# Patient Record
Sex: FEMALE | URBAN - METROPOLITAN AREA
[De-identification: names, ages, dates, MRNs, and addresses within clinical notes are randomized per-mention and may not be internally consistent; named-entity substitution may affect disease eponyms.]

---

## 2024-09-25 ENCOUNTER — LAB REQUISITION (OUTPATIENT)
Dept: LAB | Facility: CLINIC | Age: 29
End: 2024-09-25

## 2024-09-25 DIAGNOSIS — R10.2 PELVIC AND PERINEAL PAIN: ICD-10-CM

## 2024-09-25 PROCEDURE — 87591 N.GONORRHOEAE DNA AMP PROB: CPT | Performed by: MIDWIFE

## 2024-09-25 PROCEDURE — 87491 CHLMYD TRACH DNA AMP PROBE: CPT | Performed by: MIDWIFE

## 2024-09-26 LAB
C TRACH DNA SPEC QL NAA+PROBE: NEGATIVE
N GONORRHOEA DNA SPEC QL NAA+PROBE: NEGATIVE

## 2024-12-19 ENCOUNTER — APPOINTMENT (OUTPATIENT)
Dept: INTERPRETER SERVICES | Facility: CLINIC | Age: 29
End: 2024-12-19

## 2024-12-27 ENCOUNTER — HOSPITAL ENCOUNTER (OUTPATIENT)
Dept: ULTRASOUND IMAGING | Facility: CLINIC | Age: 29
Discharge: HOME | End: 2024-12-27
Attending: MIDWIFE | Admitting: MIDWIFE

## 2024-12-27 DIAGNOSIS — N91.2 AMENORRHEA: ICD-10-CM

## 2024-12-27 PROCEDURE — T1013 SIGN LANG/ORAL INTERPRETER: HCPCS | Mod: U4

## 2024-12-27 PROCEDURE — 76801 OB US < 14 WKS SINGLE FETUS: CPT | Mod: 26 | Performed by: RADIOLOGY

## 2024-12-27 PROCEDURE — 76801 OB US < 14 WKS SINGLE FETUS: CPT

## 2025-01-10 ENCOUNTER — LAB REQUISITION (OUTPATIENT)
Dept: LAB | Facility: CLINIC | Age: 30
End: 2025-01-10

## 2025-01-10 ENCOUNTER — MEDICAL CORRESPONDENCE (OUTPATIENT)
Dept: HEALTH INFORMATION MANAGEMENT | Facility: CLINIC | Age: 30
End: 2025-01-10

## 2025-01-10 DIAGNOSIS — O09.91 SUPERVISION OF HIGH RISK PREGNANCY, UNSPECIFIED, FIRST TRIMESTER: ICD-10-CM

## 2025-01-10 PROCEDURE — 84460 ALANINE AMINO (ALT) (SGPT): CPT | Performed by: MIDWIFE

## 2025-01-10 PROCEDURE — 82565 ASSAY OF CREATININE: CPT | Performed by: MIDWIFE

## 2025-01-11 LAB
ALT SERPL W P-5'-P-CCNC: 13 U/L (ref 0–50)
CREAT SERPL-MCNC: 0.48 MG/DL (ref 0.51–0.95)
EGFRCR SERPLBLD CKD-EPI 2021: >90 ML/MIN/1.73M2

## 2025-01-13 ENCOUNTER — TRANSCRIBE ORDERS (OUTPATIENT)
Dept: MATERNAL FETAL MEDICINE | Facility: CLINIC | Age: 30
End: 2025-01-13

## 2025-01-13 DIAGNOSIS — O26.90 PREGNANCY RELATED CONDITION, ANTEPARTUM: Primary | ICD-10-CM

## 2025-01-14 ENCOUNTER — CARE COORDINATION (OUTPATIENT)
Dept: MATERNAL FETAL MEDICINE | Facility: CLINIC | Age: 30
End: 2025-01-14

## 2025-01-14 DIAGNOSIS — O09.899 HX OF PRETERM DELIVERY, CURRENTLY PREGNANT: Primary | ICD-10-CM

## 2025-01-15 ENCOUNTER — LAB REQUISITION (OUTPATIENT)
Dept: LAB | Facility: CLINIC | Age: 30
End: 2025-01-15

## 2025-01-15 DIAGNOSIS — O09.91 SUPERVISION OF HIGH RISK PREGNANCY, UNSPECIFIED, FIRST TRIMESTER: ICD-10-CM

## 2025-01-15 PROCEDURE — 87591 N.GONORRHOEAE DNA AMP PROB: CPT | Performed by: MIDWIFE

## 2025-01-15 PROCEDURE — 87491 CHLMYD TRACH DNA AMP PROBE: CPT | Performed by: MIDWIFE

## 2025-01-16 LAB
C TRACH DNA SPEC QL NAA+PROBE: NEGATIVE
N GONORRHOEA DNA SPEC QL NAA+PROBE: NEGATIVE
SPECIMEN TYPE: NORMAL
SPECIMEN TYPE: NORMAL

## 2025-01-27 ENCOUNTER — PRE VISIT (OUTPATIENT)
Dept: MATERNAL FETAL MEDICINE | Facility: CLINIC | Age: 30
End: 2025-01-27

## 2025-02-04 ENCOUNTER — APPOINTMENT (OUTPATIENT)
Dept: INTERPRETER SERVICES | Facility: CLINIC | Age: 30
End: 2025-02-04

## 2025-02-11 NOTE — PROGRESS NOTES
[facility]  [address] [place] [address] [address], [place] [address]  Main: [phone], Fax: [phone]       Camelia Santillan  : 1995  MRN: [id number]    REFERRAL: Josephine Hand CNM     Due to language barrier, a [place] iPad  was present during the history-taking and subsequent discussion (and for part of the physical exam) with this patient.    HPI     Camelia Santillan is a 30 year old  at 16w3d by LMP c/w 9w4d US here for MFM consultation regarding history of multiple pre-term deliveries and unknown  section scar. Her obstetric history is further detailed below.     In 2023 at 19w6d, Camelia presented to [facility] in [place] for evaluation of vaginal bleeding. At this time, she reported history of three prior  deliveries, one of which she reports was via  section (unknown uterine incision). On presentation to the triage unit she continued to have vaginal bleeding and an ultrasound was performed with evidence of a complete placenta previa with an abruption noted on the anterior aspect of the placenta and behind the placenta above the cervix with areas of active blood flow. She ultimately underwent dilation and evacuation that was notable for an EBL of 700 mL requiring use of multiple uterotonic agents secondary to uterine atony. Final pathology of the placenta revealed evidence of chronic intra-amniotic infection as well as a two vessel umbilical cord.     Camelia notably immigrated to the [place] in [place] with her partner and three children. Per chart review, prior to the aforementioned pregnancy, her obstetric history is additionally notable for the following.     2013: Spontaneous vaginal delivery at 28 weeks in the setting of pre-term labor; of note, had evidence of placenta previa earlier in the pregnancy (26 weeks), but was told that it had resolved prior to delivery. She reports that she  "started to experience regular uterine contractions and subsequent heavy vaginal bleeding. Reports that from the time she presented to the hospital until she delivered was 2 hours. Reports that her infant weight 1800 grams. [[facility]]    2018: Spontaneous vaginal delivery at 32 weeks in the setting of pre-term labor after onset of regular uterine contractions. States that after presenting to the hospital her infant delivered within 6 hours. [Critical access hospital]    2019:  section (unknown uterine scar given lack of records) at around 30 weeks in the setting of pre-term labor and transverse presentation. She reports that due to her baby's position, her surgeon's first made a low-transverse incision, but ultimately needed to make a \"T\" shape in order to safely deliver her baby. [Critical access hospital]    Today, she reports is feeling well from an obstetric standpoint. The only medication she takes daily is a prenatal vitamin with folic acid supplementation.     Prenatal Care:  [facility] this [facility]     OB History    Para Term  AB Living   5 3 0 3 1 3   SAB IAB Ectopic Multiple Live Births   0 1 0 0 3      # Outcome Date GA Lbr Larry/2nd Weight Sex Type Anes PTL Lv   5 Current            4 IAB  20w0d    IAB Gen        Birth Comments: bleeding previa/hemorrhage elected for termination      Complications: Placenta Previa, Hemorrhage, Abruptio Placenta   3  18 30w0d   M CS-Unspec   RYLIE      Birth Comments: in [facility] in hospital   2  14 30w0d  2.4 kg (5 lb 4.7 oz) M Vag-Spont   RYLIE      Birth Comments: in hospital in [facility]   1  13 28w0d   M Vag-Spont   RYLIE      Birth Comments: In hospital in [facility] placentia previa       Gynecologic History   - Denies any history of abnormal pap smears or cervical excisional procedures, however, " "records unavailable   - Denies any history of frequent UTIs, vaginal infections, or STIs    Past Medical History   No past medical history on file.    Past Surgical History   No past surgical history on file.    Medication List     Prior to Admission medications    Not on File       Allergies   Patient has no known allergies.    Social History   Denies use of alcohol, drugs or smoking.    Family History   Please see genetic counseling note for detailed 3-generation family history.     Family history significant for Camelia reported her maternal grandmother had many miscarriages, Camelia's partner also reported his mother may have had three pregnancy losses as well.   Camelia's partner's maternal grandmother has diabetes.     Physical Exam   LMP 10/20/2024     Gen: Alert and oriented, in no acute distress  CV: Regular rate, well perfused  Resp: Non-labored breathing on room air  Abd: Non-distended  Ext: Warm, well perfused    Labs     Recent Results (from the past 720 hours)   Chlamydia trachomatis PCR    Collection Time: 01/15/25  1:33 PM    Specimen: Vagina; Swab   Result Value Ref Range    Chlamydia trachomatis Negative Negative    Chlamydia trachomatis Specimen Source Vagina    Neisseria gonorrhoeae PCR    Collection Time: 01/15/25  1:33 PM    Specimen: Vagina; Swab   Result Value Ref Range    Neisseria gonorrhoeae Negative Negative    Neisseria gonorrhoeae Specimen Source Vagina       Ultrasound   See today's ultrasound report under the \"imaging\" tab.    Other Pertinent Evaluation     US OB <14 WEEKS 12/27/2024  -----------------------------------------------  FINDINGS:   There is a gestational sac within the uterine fundus. The amniotic  fluid volume and sac size are within expected limits for gestation  age. There is an embryo and yolk sac present within the gestational  sac. The embryo has a crown-rump length of 27 mm corresponding to 9  weeks, 4 days gestation. Embryonic cardiac activity is present at 167  bpm beats " per minute. It is too early to accurately determine  placental site.     No free fluid in the pelvis. No uterine masses. The right ovary  measures 1.7 x 3.2 x 2.1 cm. The left ovary measures 1.8 x 4.4 x 2.4  cm with a corpus luteum. Normal blood flow to the ovaries.                                                              IMPRESSION:      Single intrauterine embryo with ultrasound gestational age of 9 weeks,  4 days corresponding to ultrasound estimated date of delivery of  2025.      Assessment/Counseling     Camelia Santillan is a 30 year old  at 15w1d by LMP c/w 9w4d US here for MFM consultation regarding history of multiple pre-term deliveries and unknown  section scar (likely a T-shaped incision given patient's description as detailed above).     Today, we discussed Camelia's history of multiple previous  deliveries.      birth is defined as a delivery occurring at or after 20 0/7 weeks of gestation and before 37 0/7 weeks of gestation.  birth may be spontaneous (following  labor, PPROM, or cervical insufficiency) or it may be indicated by a specific maternal or fetal complication. Many factors have been associated with  birth, including maternal demographics and characteristics, social and economic factors, medical complications, obstetric history, and conditions specific to the current pregnancy. Several risk factors for  birth are potentially modifiable, including low maternal prepregnancy weight, smoking, substance use, and short interpregnancy interval.     A history of  birth is a very strong predictor of subsequent  birth. The number of prior  births and the degree of prematurity at the prior birth significantly affect the recurrence risk of  birth. This risk of  birth is highest when the most recent pregnancy was complicated by  birth, or if the patient has a history of multiple   births. After one  birth, the risk of a recurrent  birth is as high as 30%. After two  births, the risk of recurrence is as high as 60%. In the majority of cases, a recurrent  birth will occur at a similar gestational age as the previous birth.    Clinical factors during a current pregnancy that have been associated with an increased risk of  birth include vaginal bleeding, urinary tract infections (UTIs), genital tract infections, multiple gestation, and short cervix.     There historically have been two main strategies for  birth prevention in subsequent pregnancies following a spontaneous  delivery: progesterone supplementation and cervical length screening with cerclage placement for short cervix.    Given the association of short cervix and  birth and the demonstrated efficacy of vaginal progesterone in the setting of short cervix - we would recommend assessment of cervical length in the second trimester as this has been shown to identify women at increased risk for  birth. Cervical length less than 25 mm before 24 weeks of gestation has a sensitivity of 65.4% for  birth before 35 weeks of gestation. Therefore, in the event of cervical shortening <25mm cerclage placement can be offered as it has been shown to prolong pregnancy and improve  outcomes. Because of the relatively high detection rate and predictive value in individuals with prior  birth, and because treatment is available, serial endovaginal ultrasound measurement of cervical length beginning at 16 0/7 weeks of gestation and repeated until 24 0/7 weeks of gestation for individuals with a molina pregnancy and a prior spontaneous  birth is recommended.   Recommendations     Genetic screening  - Met with genetic counseling (see separate note for full details)  - Had NIPT collected today for genetic screening    Medications  - Continue daily use of prenatal  "vitamin    Laboratory evaluation  - Urine culture every trimester with aggressive treatment of bacteriuria    Maternal antepartum management  - Clinical evaluation of  labor symptoms.   - Consider administration of  corticosteroids if the patient is deemed to be at increased risk of imminent  delivery     Ultrasound surveillance  - Serial cervical length measurements via transvaginal ultrasound weekly from now until 23w6d gestation  - If cervical shortening < 25mm prior to 24 weeks recommend further counseling regarding treatment with vaginal progesterone vs.cervical cerclage     Timing and mode of delivery  - Discussed that given unknown uterine scar with pre-term delivery and being told she had a \"T-Shaped\" incision, would recommend repeat  sectio between 36-37 weeks.  If able, obtain official operative report to confirm incision type. Discussed risks associated with labor on a prior classical uterine incision including uterine rupture. Discussed that uterine rupture is an obstetric emergency as it impairs oxygenation to the fetus and can result in infant demise. Additionally discussed increased risks to the pregnant individual as well. She expresses understanding of this recommendation.     The patient was seen and evaluated with Dr. Nichelle Henson     At the end of our discussion, Ms. Cody Santillan indicated that her questions were answered and she seemed satisfied with our discussion.  Thank you for allowing us to participate in the care of your patient. Please do not hesitate to contact us if you have further questions regarding the management of your patient.      Ledy Reyes MD  Obstetrics and Gynecology, PGY-2  25 4:05 PM  ---------                                                                                                          [facility] Physician Attestation  I saw this patient with the resident and agree with the their findings and plan of care as documented in " the note.  I personally reviewed her vital signs, medications, labs, pertinent imaging, and fetal monitoring.    I personally spent a total of 45 minutes, including both face-to-face and non-face-to-face on the date of the encounter, addressing the above diagnosis. Activities performed in this time include chart review, obtaining / reviewing history, performing a medically necessary evaluation, documentation and counseling/care coordination/ordering tests/ordering meds.      Nichelle Henson MD  Maternal Fetal Medicine   Date of Service (when I saw the patient): 2/17/2025

## 2025-02-13 ENCOUNTER — LAB (OUTPATIENT)
Dept: LAB | Facility: CLINIC | Age: 30
End: 2025-02-13
Attending: STUDENT IN AN ORGANIZED HEALTH CARE EDUCATION/TRAINING PROGRAM
Payer: MEDICAID

## 2025-02-13 ENCOUNTER — OFFICE VISIT (OUTPATIENT)
Dept: MATERNAL FETAL MEDICINE | Facility: CLINIC | Age: 30
End: 2025-02-13
Attending: STUDENT IN AN ORGANIZED HEALTH CARE EDUCATION/TRAINING PROGRAM
Payer: MEDICAID

## 2025-02-13 ENCOUNTER — HOSPITAL ENCOUNTER (EMERGENCY)
Facility: CLINIC | Age: 30
End: 2025-02-13

## 2025-02-13 VITALS
SYSTOLIC BLOOD PRESSURE: 93 MMHG | DIASTOLIC BLOOD PRESSURE: 61 MMHG | RESPIRATION RATE: 18 BRPM | HEART RATE: 69 BPM | OXYGEN SATURATION: 98 %

## 2025-02-13 DIAGNOSIS — O09.899 HX OF PRETERM DELIVERY, CURRENTLY PREGNANT: ICD-10-CM

## 2025-02-13 DIAGNOSIS — O09.899 HX OF PRETERM DELIVERY, CURRENTLY PREGNANT: Primary | ICD-10-CM

## 2025-02-13 DIAGNOSIS — O09.91 HIGH-RISK PREGNANCY IN FIRST TRIMESTER: ICD-10-CM

## 2025-02-13 DIAGNOSIS — Z87.51 HISTORY OF PRETERM DELIVERY: ICD-10-CM

## 2025-02-13 DIAGNOSIS — Z34.82 SUPERVISION OF NORMAL INTRAUTERINE PREGNANCY IN MULTIGRAVIDA IN SECOND TRIMESTER: ICD-10-CM

## 2025-02-13 DIAGNOSIS — Z34.82 SUPERVISION OF NORMAL INTRAUTERINE PREGNANCY IN MULTIGRAVIDA IN SECOND TRIMESTER: Primary | ICD-10-CM

## 2025-02-13 PROBLEM — Z28.39 RUBELLA NON-IMMUNE STATUS, ANTEPARTUM: Status: ACTIVE | Noted: 2025-01-15

## 2025-02-13 PROBLEM — E55.9 VITAMIN D DEFICIENCY: Status: ACTIVE | Noted: 2025-01-13

## 2025-02-13 PROBLEM — N85.8 UTERINE SCAR FROM PREVIOUS SURGERY: Status: ACTIVE | Noted: 2025-01-14

## 2025-02-13 PROBLEM — Z28.39 MATERNAL VARICELLA, NON-IMMUNE: Status: ACTIVE | Noted: 2025-01-13

## 2025-02-13 PROCEDURE — T1013 SIGN LANG/ORAL INTERPRETER: HCPCS | Mod: U4

## 2025-02-13 PROCEDURE — 36415 COLL VENOUS BLD VENIPUNCTURE: CPT

## 2025-02-13 PROCEDURE — 96041 GENETIC COUNSELING SVC EA 30: CPT

## 2025-02-13 PROCEDURE — G0463 HOSPITAL OUTPT CLINIC VISIT: HCPCS | Mod: 25 | Performed by: STUDENT IN AN ORGANIZED HEALTH CARE EDUCATION/TRAINING PROGRAM

## 2025-02-13 NOTE — PROGRESS NOTES
[facility]  Genetic Counseling Consult    Patient:  Camelia Santillan  Preferred Name: Camelia YOB: 1995   Date of Service:  25   MRN: [id number]    Camelia was seen at the [facility] for genetic consultation. The indication for genetic counseling is desire to discuss options for genetic screening and diagnostics and a history of  delivery. The patient was accompanied to this visit by their partner and cousin.    The session was conducted in English.      IMPRESSION/ PLAN   1. Camelia has not had genetic screening in this pregnancy but elected to have screening today.     2. During today's [facility] visit, Camelia had a blood draw for expanded non-invasive prenatal testing (also called NIPT, NIPS, or cell-free DNA) through [facility] ([employer]). The expanded NIPT screens for trisomy 21, 18, and 13 and select microdeletion syndromes, including 22q11.2 deletion syndrome. The patient opted to screen for sex chromosome aneuploidies, including reported fetal sex. Results are expected in 7-14 days. The patient will be called with results and if they do not answer they requested a detailed message with results on their voicemail, including the predicted fetal sex information.  Patient was informed that results, including fetal sex, will be available in [place].    3. Since the patient chose aneuploidy screening via NIPT, quad screen is NOT recommended in the second trimester. If the patient desires screening for open neural tube defects, maternal serum AFP only is recommended, ideally between 16-18 weeks gestation.    4. Camelia had a early second trimester anatomy ultrasound today. Please see the ultrasound report for further details.    5. Further recommendation include a follow-up ultrasound with [facility]. The upcoming ultrasound has been scheduled for 2025. Additional follow up ultrasounds have been scheduled weekly.  "    PREGNANCY HISTORY   /Parity:       Camelia's pregnancy history is significant for:   Three previous  deliveries, one of which was a  at 30 weeks. The other two were spontaneous vaginal deliveries at 28 and 30 weeks. History of one previous IAB/IUFD around 20 weeks due to placenta previa. Pathology report showed an intra-amniotic infection and a two vessel cord.  We briefly discussed her history of  births and discussed that having a history of  deliveries increases the chance of this current pregnancy being delivered early or  as well.     CURRENT PREGNANCY   Current Age: 30 year old   Age at Delivery: 30 year old  MARTIR: 2025, by Ultrasound                                   Gestational Age: 16w3d  This pregnancy is a single gestation.   This pregnancy was conceived spontaneously.    MEDICAL HISTORY   Camelia s reported medical history is not expected to impact pregnancy management or risks to fetal development.       FAMILY HISTORY   A three-generation pedigree was obtained today and is scanned under the \"Media\" tab in Epic. The family history was reported by Camelia and their partner.    The following significant findings were reported today:   Camelia reported her maternal grandmother had many miscarriages, Camelia's partner also reported his mother may have had three pregnancy losses as well.   Camelia's partner's maternal grandmother has diabetes.     Family history of RPL    Recurrent miscarriage is defined as three or more clinically recognized consecutive or non-consecutive pregnancy losses occurring prior to fetal viability (<24 weeks). Camelia reported her maternal grandmother may have had 15 or more pregnancy losses. No pregnancy losses were reported for Camelia's mother.     At least 10-15% of the recognized pregnancies end in miscarriage, with most losses occurring in the first trimester. The rate of miscarriage less than 8 weeks gestation may be even greater as some " women may not recognize that they are pregnant. Around half of miscarriages that occur before 20 weeks gestation are caused by chromosome abnormalities. Of these chromosomally atypical fetuses, ~ approximately 60% have an autosomal trisomy with trisomy 16 being the most common. Monosomy X is the second most common chromosomal etiology, accounting for ~20%, and triploidy accounts for ~15%. Polyploidies and structural rearrangements constitute the remainder. The risk for a miscarriage increases with advancing maternal age due to a higher incidence of conceptuses with a chromosomal aneuploidy. The risk may approach 75% in women who are 45 years of age and older.     Familial chromosome rearrangements can lead to a history of recurrent pregnancy loss or infertility. They can also increase the potential risk for a stillbirth or liveborn babies with birth differences, developmental concerns, or other health problems. In this case a parent would carry a balanced rearrangement with an equal exchange of chromosome material. An individual with a balanced translocation is typically healthy. However, a child of this individual may inherit one of the rearranged chromosomes, along with typical chromosomes, resulting in an unbalanced rearrangement with total loss and gain of chromosome material. If a conceptus has unbalanced chromosome material it could lead to miscarriages or an affected child. This result can be dependent on the amount of material that is gained or loss and what chromosome the material is from. If a chromosome translocation is inherited through a family there are typically multiple individuals over multiple generations with recurrent pregnancy loss and/or affected individuals. However, it is also possible for a de dash translocation or other rearrangement to occur. For couples who have experienced three or more unexplained pregnancy losses, it has been estimated that around 2-5% of these couples may carry a  rearrangement. Chromosome rearrangements causing pregnancy loss are more common in the female partner. Chromosome rearrangements in males are more likely to cause infertility. Chromosome analysis on parents is possible by obtaining a karyotype on peripheral blood.     Other genetic causes of recurrent loss could include lethal autosomal recessive conditions of x-linked dominant conditions. Non-genetic causes of pregnancy loss can include maternal uterine anomalies, endocrine concerns such as diabetes or thyroid disease, antiphospholipid syndrome, and environmental agents. Please see [facility] consult for further details.     In about 50% of couples with recurrent pregnancy loss, the etiology remains unknown despite a thorough evaluation and is therefore classified as idiopathic. It is estimated that couples with idiopathic recurrent pregnacy loss can have up to a 75% chance of having a successful pregnancy.    Family history of diabetes    Diabetes is a complex genetic trait (a multifactorial condition) caused by the combination of genetic and environmental factors. Having a family history of diabetes can increase one's risk of also developing diabetes. For men with type I diabetes, their children have a 1 in 17 chance of developing diabetes during their lifetime. However, for woman with type I diabetes, if their child is born before 25 there is a 1 in 25 risk and if their child was born after 25 there is a 1 in 100 risk. If a parent develops diabetes before age 11, their child's risk is typically doubled. Furthermore, if both parents have type I diabetes, the risk is between 1 in 10 to 1 in 4. These risk numbers are an estimate and can be complicated by many other factors such as autoimmune disorders and lifestyle choices. Therefore, there is currently no prenatal genetic testing we would offer the patient at this time to assess for diabetes risks in the current pregnancy. We also reviewed that individuals with a family  history of type II diabetes are generally thought to be at increased risk to develop type II diabetes. Therefore, it is important for individuals with a family history of type II diabetes to let their physicians to know about this family history, so they can be appropriate screened for diabetes.     Otherwise, the reported family history is unremarkable for stillbirths, birth defects, intellectual disabilities, known genetic conditions, and consanguinity.       RISK ASSESSMENT FOR INHERITED CONDITIONS AND CARRIER SCREENING OPTIONS   Expanded carrier screening is available to screen for autosomal recessive conditions and X-linked conditions in a large list of genes. Carrier screening does not test the pregnancy but gives a risk assessment for the pregnancy and future pregnancies to have the condition. Expanded carrier screening is designed to identify carrier status for conditions that are primarily childhood or adolescent onset. Expanded carrier screening does not evaluate for adult-onset conditions such as hereditary cancer syndromes, dementia/ Alzheimer's disease, or cardiovascular disease risk factors. Additionally, expanded carrier screening is not comprehensive for all known genetic diseases or inherited conditions. Carrier screening does not test for all genetic and health conditions or risk factors.     Autosomal recessive conditions happen when a mutation has been inherited from the egg and sperm and include conditions like cystic fibrosis, thalassemia, hearing loss, spinal muscular atrophy, and more. We reviewed that when both biological parents carry a harmful genetic change in a gene associated with autosomal recessive inheritance, each of their pregnancies has a 1 in 4 (25%) chance to be affected by that condition. X-linked conditions happen when a mutation has been inherited from the egg and include conditions like fragile X syndrome.With x-linked conditions, the specific risk generally depends on the  chromosomal sex of the fetus, with XY individuals (generally male) being most severely affected.      screening was reviewed. About [place]  Screening    The patient does NOT have a family history of known inherited conditions. This does NOT mean the patient and/or their partner is not a carrier of a condition. Approximately 90% of couples at an increased reproductive risk for an inherited condition have no family history of that condition.     The patient nor their partner have had carrier screening previously.     The patient declined the carrier screening options. They are aware the option will remain, and they can contact us if they would like to pursue screening. See below for the more detailed information we dicussed.    RISK ASSESSMENT FOR CHROMOSOME CONDITIONS   We explained that the risk for fetal chromosome abnormalities increases with maternal age. We discussed specific features of common chromosome abnormalities, including trisomy 21 (Down syndrome), trisomy 13, trisomy 18, and sex chromosome trisomies.    At age 30 at midtrimester, the risk to have a baby with Down syndrome is 1 in 690.   At age 30 at midtrimester, the risk to have a baby with any chromosome abnormality is 1 in 345.     Camelia has not had genetic screening in this pregnancy but elected to have screening today.      GENETIC TESTING OPTIONS   Genetic testing during a pregnancy includes screening and diagnostic procedures.      Screening tests are non-invasive which means no risk to the pregnancy and includes ultrasounds and blood work. The benefits and limitations of screening were reviewed. Screening tests provide a risk assessment (chance) specific to the pregnancy for certain fetal chromosome abnormalities but cannot definitively diagnose or exclude a fetal chromosome abnormality. Follow-up genetic counseling and consideration of diagnostic testing is recommended with any abnormal screening result. Diagnostic testing during a  pregnancy is more certain and can test for more conditions. However, the tests do have a risk of miscarriage that requires careful consideration. These tests can detect fetal chromosome abnormalities with greater than 99% certainty. Results can be compromised by maternal cell contamination or mosaicism and are limited by the resolution of current genetic testing technology.     There is no screening or diagnostic test that detects all forms of birth defects or intellectual disability.     We discussed the following screening options:     Non-invasive prenatal testing (NIPT)  Also called cell-free DNA screening because it detects chromosomes from the placenta in the pregnant person's blood  Can be done any time after 10 weeks gestation  Standard recommendation for NIPT screens for trisomy 21, trisomy 18, trisomy 13, with the option of adding sex chromosome aneuploidies, without or without predicted sex  Cannot screen for open neural tube defects, maternal serum AFP after 15 weeks is recommended  New NIPT options include screening for other trisomies, microdeletion syndromes, and in some cases fetal blood antigens. Guidelines do not recommend these conditions are included in standard screening. These options have limitations and should be discussed with a genetic counselor.   However, current (2023) ACMG guidelines do recommend that screening for one microdeletion syndrome, called 22q11.2 deletion syndrome be offered to all pregnant patients. 22q11.2 deletion syndrome has an estimated prevalence of 1 in 990 to 1 in 2148 (0.05-0.1%). Risk is not thought to increase with maternal age. Clinical features are variable but include congenital heart defects, cleft palate, developmental delays, immune system deficiencies, and hearing loss. Approximately 90% of cases are de dash (a sporadic new change in a pregnancy). Cell-free DNA screening for 22q11.2 deletion syndrome is available with the inclusion of other microdeletion  syndromes. There is less data about the performance of cell-free DNA screening for more rare microdeletions and the chance for false positives or negative may be increased.  We discussed the limitations of cell-free DNA screening in detecting microdeletions and the possibility of false positives and false negatives. The patient opted into microdeletion syndrome screening.     We discussed the following ultrasound options:    2/3 complete ultrasound:   early anatomy scan performed around 15 weeks of gestation. This can help identify findings such as soft markers and may provide additional risk assessment.    Comprehensive level II ultrasound (Fetal Anatomy Ultrasound)  Ultrasound done between 18-20 weeks gestation  Screens for major birth defects and markers for aneuploidy (like trisomy 21 and trisomy 18)  Includes looking at the fetus/baby's growth, heart, organs (stomach, kidneys), placenta, and amniotic fluid    We discussed the following diagnostic options:     Amniocentesis  Invasive diagnostic procedure done after 15 weeks gestation  The procedure collects a small sample of amniotic fluid for the purpose of chromosomal testing and/or other genetic testing  Diagnostic result; more than 99% sensitivity for fetal chromosome abnormalities  Testing for AFP in the amniotic fluid can test for open neural tube defects    It was a pleasure to be involved with [facility]. I spent 60 minutes on the date of the encounter doing chart review, obtaining history, test coordination, documentation, and further activities as noted above.    Wolfgang Gan MS, [facility]  Board Certified and [place] Licensed Genetic Counselor  [facility]  Maternal Fetal Medicine  Office: [phone]  [facility]: [phone]   Fax: [phone]  [facility]

## 2025-02-13 NOTE — NURSING NOTE
Camelia and Significant here for M  consult at 16w3d related to history of PTD x3, C/S x1. Medications and allergies reviewed. Dr. Reyes and Dr. RODERICK Henson met with patient to discuss plan. Plan for weekly TV until 23.6, growth US between 18-20 wks. Patient discharged stable and ambulatory.

## 2025-02-28 ENCOUNTER — HOSPITAL ENCOUNTER (OUTPATIENT)
Dept: ULTRASOUND IMAGING | Facility: CLINIC | Age: 30
Discharge: HOME | End: 2025-02-28
Attending: OBSTETRICS & GYNECOLOGY | Admitting: OBSTETRICS & GYNECOLOGY
Payer: MEDICAID

## 2025-02-28 DIAGNOSIS — O09.899 HX OF PRETERM DELIVERY, CURRENTLY PREGNANT: ICD-10-CM

## 2025-02-28 DIAGNOSIS — Z87.51 HISTORY OF PRETERM DELIVERY: ICD-10-CM

## 2025-02-28 DIAGNOSIS — O09.91 HIGH-RISK PREGNANCY IN FIRST TRIMESTER: ICD-10-CM

## 2025-02-28 PROCEDURE — 76817 TRANSVAGINAL US OBSTETRIC: CPT

## 2025-02-28 PROCEDURE — 76817 TRANSVAGINAL US OBSTETRIC: CPT | Mod: 26 | Performed by: OBSTETRICS & GYNECOLOGY

## 2025-03-04 PROBLEM — O26.872 SHORT CERVIX DURING PREGNANCY IN SECOND TRIMESTER: Status: ACTIVE | Noted: 2025-03-04

## 2025-03-07 ENCOUNTER — HOSPITAL ENCOUNTER (OUTPATIENT)
Dept: ULTRASOUND IMAGING | Facility: CLINIC | Age: 30
Discharge: HOME | End: 2025-03-07
Attending: STUDENT IN AN ORGANIZED HEALTH CARE EDUCATION/TRAINING PROGRAM | Admitting: STUDENT IN AN ORGANIZED HEALTH CARE EDUCATION/TRAINING PROGRAM
Payer: MEDICAID

## 2025-03-07 DIAGNOSIS — Z87.51 HISTORY OF PRETERM DELIVERY: ICD-10-CM

## 2025-03-07 DIAGNOSIS — O09.899 HX OF PRETERM DELIVERY, CURRENTLY PREGNANT: ICD-10-CM

## 2025-03-07 DIAGNOSIS — O09.91 HIGH-RISK PREGNANCY IN FIRST TRIMESTER: ICD-10-CM

## 2025-03-07 PROCEDURE — 76817 TRANSVAGINAL US OBSTETRIC: CPT | Mod: 26 | Performed by: STUDENT IN AN ORGANIZED HEALTH CARE EDUCATION/TRAINING PROGRAM

## 2025-03-07 PROCEDURE — 99214 OFFICE O/P EST MOD 30 MIN: CPT | Mod: 25 | Performed by: STUDENT IN AN ORGANIZED HEALTH CARE EDUCATION/TRAINING PROGRAM

## 2025-03-07 PROCEDURE — 76817 TRANSVAGINAL US OBSTETRIC: CPT

## 2025-03-10 ENCOUNTER — APPOINTMENT (OUTPATIENT)
Dept: INTERPRETER SERVICES | Facility: CLINIC | Age: 30
End: 2025-03-10
Payer: MEDICAID

## 2025-03-11 ENCOUNTER — TELEPHONE (OUTPATIENT)
Dept: MATERNAL FETAL MEDICINE | Facility: CLINIC | Age: 30
End: 2025-03-11
Payer: MEDICAID

## 2025-03-11 ENCOUNTER — APPOINTMENT (OUTPATIENT)
Dept: INTERPRETER SERVICES | Facility: CLINIC | Age: 30
End: 2025-03-11
Payer: MEDICAID

## 2025-03-11 NOTE — TELEPHONE ENCOUNTER
Writer spoke with Camelia with assistance of [place] phone  late afternoon 3/10 regarding cerclage that was scheduled for earlier in the day on 3/10. Pt stated she still wants the cerclage but wants to schedule it for Friday 3/14.     Cerclage scheduled for Friday 3/14 at 1030. Left message for patient to call KAREEN RN Coordinator at [phone] to discuss cerclage for 3/14.       Danielle Perez RN

## 2025-03-13 ENCOUNTER — ANESTHESIA EVENT (OUTPATIENT)
Dept: OBGYN | Facility: CLINIC | Age: 30
End: 2025-03-13
Payer: MEDICAID

## 2025-03-14 ENCOUNTER — ANESTHESIA (OUTPATIENT)
Dept: OBGYN | Facility: CLINIC | Age: 30
End: 2025-03-14
Payer: MEDICAID

## 2025-03-14 ENCOUNTER — HOSPITAL ENCOUNTER (OUTPATIENT)
Facility: CLINIC | Age: 30
Discharge: HOME | End: 2025-03-14
Attending: OBSTETRICS & GYNECOLOGY | Admitting: OBSTETRICS & GYNECOLOGY
Payer: MEDICAID

## 2025-03-14 VITALS
DIASTOLIC BLOOD PRESSURE: 55 MMHG | RESPIRATION RATE: 14 BRPM | HEART RATE: 64 BPM | OXYGEN SATURATION: 100 % | HEIGHT: 59 IN | TEMPERATURE: 97.9 F | BODY MASS INDEX: 23.2 KG/M2 | SYSTOLIC BLOOD PRESSURE: 96 MMHG | WEIGHT: 115.1 LBS

## 2025-03-14 LAB
ABO + RH BLD: NORMAL
ALBUMIN UR-MCNC: NEGATIVE MG/DL
APPEARANCE UR: CLEAR
BILIRUB UR QL STRIP: NEGATIVE
BLD GP AB SCN SERPL QL: NEGATIVE
COLOR UR AUTO: ABNORMAL
ERYTHROCYTE [DISTWIDTH] IN BLOOD BY AUTOMATED COUNT: 12.4 % (ref 10–15)
GLUCOSE UR STRIP-MCNC: NEGATIVE MG/DL
HCT VFR BLD AUTO: 35.7 % (ref 35–47)
HGB BLD-MCNC: 12 G/DL (ref 11.7–15.7)
HGB UR QL STRIP: NEGATIVE
KETONES UR STRIP-MCNC: NEGATIVE MG/DL
LEUKOCYTE ESTERASE UR QL STRIP: NEGATIVE
MCH RBC QN AUTO: 32.9 PG (ref 26.5–33)
MCHC RBC AUTO-ENTMCNC: 33.6 G/DL (ref 31.5–36.5)
MCV RBC AUTO: 98 FL (ref 78–100)
MUCOUS THREADS #/AREA URNS LPF: PRESENT /LPF
NITRATE UR QL: NEGATIVE
PH UR STRIP: 6.5 [PH] (ref 5–7)
PLATELET # BLD AUTO: 230 10E3/UL (ref 150–450)
RBC # BLD AUTO: 3.65 10E6/UL (ref 3.8–5.2)
RBC URINE: <1 /HPF
SP GR UR STRIP: 1.02 (ref 1–1.03)
SPECIMEN EXP DATE BLD: NORMAL
SQUAMOUS EPITHELIAL: 4 /HPF
UROBILINOGEN UR STRIP-MCNC: NORMAL MG/DL
WBC # BLD AUTO: 6 10E3/UL (ref 4–11)
WBC URINE: <1 /HPF

## 2025-03-14 PROCEDURE — 250N000011 HC RX IP 250 OP 636

## 2025-03-14 PROCEDURE — 81001 URINALYSIS AUTO W/SCOPE: CPT | Performed by: STUDENT IN AN ORGANIZED HEALTH CARE EDUCATION/TRAINING PROGRAM

## 2025-03-14 PROCEDURE — 710N000010 HC RECOVERY PHASE 1, LEVEL 2, PER MIN: Performed by: OBSTETRICS & GYNECOLOGY

## 2025-03-14 PROCEDURE — 258N000003 HC RX IP 258 OP 636

## 2025-03-14 PROCEDURE — 360N000074 HC SURGERY LEVEL 1, PER MIN: Performed by: OBSTETRICS & GYNECOLOGY

## 2025-03-14 PROCEDURE — 85018 HEMOGLOBIN: CPT

## 2025-03-14 PROCEDURE — 999N000141 HC STATISTIC PRE-PROCEDURE NURSING ASSESSMENT: Performed by: OBSTETRICS & GYNECOLOGY

## 2025-03-14 PROCEDURE — 87491 CHLMYD TRACH DNA AMP PROBE: CPT | Performed by: STUDENT IN AN ORGANIZED HEALTH CARE EDUCATION/TRAINING PROGRAM

## 2025-03-14 PROCEDURE — 86850 RBC ANTIBODY SCREEN: CPT

## 2025-03-14 PROCEDURE — 86900 BLOOD TYPING SEROLOGIC ABO: CPT

## 2025-03-14 PROCEDURE — 59320 REVISION OF CERVIX: CPT | Mod: GC | Performed by: OBSTETRICS & GYNECOLOGY

## 2025-03-14 PROCEDURE — 272N000001 HC OR GENERAL SUPPLY STERILE: Performed by: OBSTETRICS & GYNECOLOGY

## 2025-03-14 PROCEDURE — 250N000013 HC RX MED GY IP 250 OP 250 PS 637: Performed by: STUDENT IN AN ORGANIZED HEALTH CARE EDUCATION/TRAINING PROGRAM

## 2025-03-14 PROCEDURE — 250N000013 HC RX MED GY IP 250 OP 250 PS 637: Performed by: OBSTETRICS & GYNECOLOGY

## 2025-03-14 PROCEDURE — 370N000017 HC ANESTHESIA TECHNICAL FEE, PER MIN: Performed by: OBSTETRICS & GYNECOLOGY

## 2025-03-14 PROCEDURE — 85048 AUTOMATED LEUKOCYTE COUNT: CPT

## 2025-03-14 RX ORDER — ONDANSETRON 2 MG/ML
4 INJECTION INTRAMUSCULAR; INTRAVENOUS EVERY 6 HOURS PRN
Status: DISCONTINUED | OUTPATIENT
Start: 2025-03-14 | End: 2025-03-14 | Stop reason: HOSPADM

## 2025-03-14 RX ORDER — NALBUPHINE HYDROCHLORIDE 10 MG/ML
2.5-5 INJECTION INTRAMUSCULAR; INTRAVENOUS; SUBCUTANEOUS EVERY 6 HOURS PRN
Status: DISCONTINUED | OUTPATIENT
Start: 2025-03-14 | End: 2025-03-14 | Stop reason: HOSPADM

## 2025-03-14 RX ORDER — CITRIC ACID/SODIUM CITRATE 334-500MG
30 SOLUTION, ORAL ORAL ONCE
Status: CANCELLED | OUTPATIENT
Start: 2025-03-14 | End: 2025-03-14

## 2025-03-14 RX ORDER — ONDANSETRON 2 MG/ML
4 INJECTION INTRAMUSCULAR; INTRAVENOUS EVERY 30 MIN PRN
Status: DISCONTINUED | OUTPATIENT
Start: 2025-03-14 | End: 2025-03-14 | Stop reason: HOSPADM

## 2025-03-14 RX ORDER — METOCLOPRAMIDE 10 MG/1
10 TABLET ORAL EVERY 6 HOURS PRN
Status: DISCONTINUED | OUTPATIENT
Start: 2025-03-14 | End: 2025-03-14 | Stop reason: HOSPADM

## 2025-03-14 RX ORDER — ONDANSETRON 2 MG/ML
INJECTION INTRAMUSCULAR; INTRAVENOUS PRN
Status: DISCONTINUED | OUTPATIENT
Start: 2025-03-14 | End: 2025-03-14

## 2025-03-14 RX ORDER — PROCHLORPERAZINE MALEATE 10 MG
10 TABLET ORAL EVERY 6 HOURS PRN
Status: DISCONTINUED | OUTPATIENT
Start: 2025-03-14 | End: 2025-03-14 | Stop reason: HOSPADM

## 2025-03-14 RX ORDER — CITRIC ACID/SODIUM CITRATE 334-500MG
SOLUTION, ORAL ORAL
Status: COMPLETED
Start: 2025-03-14 | End: 2025-03-14

## 2025-03-14 RX ORDER — ONDANSETRON 4 MG/1
4 TABLET, ORALLY DISINTEGRATING ORAL EVERY 6 HOURS PRN
Status: DISCONTINUED | OUTPATIENT
Start: 2025-03-14 | End: 2025-03-14 | Stop reason: HOSPADM

## 2025-03-14 RX ORDER — ACETAMINOPHEN 325 MG/1
650 TABLET ORAL
Status: DISCONTINUED | OUTPATIENT
Start: 2025-03-14 | End: 2025-03-14 | Stop reason: HOSPADM

## 2025-03-14 RX ORDER — ONDANSETRON 4 MG/1
4 TABLET, ORALLY DISINTEGRATING ORAL EVERY 30 MIN PRN
Status: DISCONTINUED | OUTPATIENT
Start: 2025-03-14 | End: 2025-03-14 | Stop reason: HOSPADM

## 2025-03-14 RX ORDER — SODIUM CHLORIDE, SODIUM LACTATE, POTASSIUM CHLORIDE, CALCIUM CHLORIDE 600; 310; 30; 20 MG/100ML; MG/100ML; MG/100ML; MG/100ML
INJECTION, SOLUTION INTRAVENOUS CONTINUOUS PRN
Status: DISCONTINUED | OUTPATIENT
Start: 2025-03-14 | End: 2025-03-14 | Stop reason: HOSPADM

## 2025-03-14 RX ORDER — METOCLOPRAMIDE HYDROCHLORIDE 5 MG/ML
10 INJECTION INTRAMUSCULAR; INTRAVENOUS EVERY 6 HOURS PRN
Status: DISCONTINUED | OUTPATIENT
Start: 2025-03-14 | End: 2025-03-14 | Stop reason: HOSPADM

## 2025-03-14 RX ORDER — NALOXONE HYDROCHLORIDE 0.4 MG/ML
0.1 INJECTION, SOLUTION INTRAMUSCULAR; INTRAVENOUS; SUBCUTANEOUS
Status: DISCONTINUED | OUTPATIENT
Start: 2025-03-14 | End: 2025-03-14 | Stop reason: HOSPADM

## 2025-03-14 RX ORDER — FENTANYL CITRATE-0.9 % NACL/PF 10 MCG/ML
100 PLASTIC BAG, INJECTION (ML) INTRAVENOUS EVERY 5 MIN PRN
Status: DISCONTINUED | OUTPATIENT
Start: 2025-03-14 | End: 2025-03-14 | Stop reason: HOSPADM

## 2025-03-14 RX ORDER — FENTANYL CITRATE-0.9 % NACL/PF 10 MCG/ML
PLASTIC BAG, INJECTION (ML) INTRAVENOUS CONTINUOUS PRN
Status: DISCONTINUED | OUTPATIENT
Start: 2025-03-14 | End: 2025-03-14

## 2025-03-14 RX ORDER — CITRIC ACID/SODIUM CITRATE 334-500MG
30 SOLUTION, ORAL ORAL ONCE
Status: COMPLETED | OUTPATIENT
Start: 2025-03-14 | End: 2025-03-14

## 2025-03-14 RX ORDER — BUPIVACAINE HYDROCHLORIDE 7.5 MG/ML
INJECTION, SOLUTION INTRASPINAL
Status: COMPLETED | OUTPATIENT
Start: 2025-03-14 | End: 2025-03-14

## 2025-03-14 RX ORDER — DEXAMETHASONE SODIUM PHOSPHATE 4 MG/ML
4 INJECTION, SOLUTION INTRA-ARTICULAR; INTRALESIONAL; INTRAMUSCULAR; INTRAVENOUS; SOFT TISSUE
Status: DISCONTINUED | OUTPATIENT
Start: 2025-03-14 | End: 2025-03-14 | Stop reason: HOSPADM

## 2025-03-14 RX ORDER — SODIUM CHLORIDE, SODIUM LACTATE, POTASSIUM CHLORIDE, CALCIUM CHLORIDE 600; 310; 30; 20 MG/100ML; MG/100ML; MG/100ML; MG/100ML
INJECTION, SOLUTION INTRAVENOUS CONTINUOUS
Status: DISCONTINUED | OUTPATIENT
Start: 2025-03-14 | End: 2025-03-14 | Stop reason: HOSPADM

## 2025-03-14 RX ADMIN — ACETAMINOPHEN 650 MG: 325 TABLET, FILM COATED ORAL at 14:22

## 2025-03-14 RX ADMIN — BUPIVACAINE HYDROCHLORIDE IN DEXTROSE 1.2 ML: 7.5 INJECTION, SOLUTION SUBARACHNOID at 12:47

## 2025-03-14 RX ADMIN — SODIUM CHLORIDE, POTASSIUM CHLORIDE, SODIUM LACTATE AND CALCIUM CHLORIDE: 600; 310; 30; 20 INJECTION, SOLUTION INTRAVENOUS at 12:44

## 2025-03-14 RX ADMIN — SODIUM CITRATE AND CITRIC ACID MONOHYDRATE 30 ML: 500; 334 SOLUTION ORAL at 12:33

## 2025-03-14 RX ADMIN — Medication 30 ML: at 12:33

## 2025-03-14 RX ADMIN — ONDANSETRON 4 MG: 2 INJECTION INTRAMUSCULAR; INTRAVENOUS at 12:58

## 2025-03-14 RX ADMIN — SODIUM CHLORIDE, POTASSIUM CHLORIDE, SODIUM LACTATE AND CALCIUM CHLORIDE: 600; 310; 30; 20 INJECTION, SOLUTION INTRAVENOUS at 10:52

## 2025-03-14 RX ADMIN — Medication 25 MCG/MIN: at 12:48

## 2025-03-14 ASSESSMENT — ACTIVITIES OF DAILY LIVING (ADL)
ADLS_ACUITY_SCORE: 41
ADLS_ACUITY_SCORE: 15

## 2025-03-14 NOTE — PROGRESS NOTES
Patient able to eat/drink, ambulate and void. No VB or LOF. Mild cramping managed by PO Tylenol. Endorses + FM. Ok to discharge home. Spouse here to drive patient home. IV removed. Discharge instructions reviewed with patient via in person . Questions answered. Patient to call clinic to schedule regular OB follow up appt. US scheduled on 3/21 at [facility] clinic. Patient discharged at 1830.

## 2025-03-14 NOTE — PROGRESS NOTES
Pt prepped for cerclage. IV started and labs sent. Occ contraction noted on [place]. Pt reports not feeling them. Wet prep and GC/CT collected in OR and sent to lab. Cerclage performed without complication. See providers note. Doptone pre and post procedure WNL. No ancef or indocin needed. Pt recovering well and in stable condition currently.

## 2025-03-14 NOTE — H&P
"[facility]  OB History and Physical      Camelia Santillan MRN# [id number]   Age: 30 year old YOB: 1995     CC: Here for cerclage.     HPI:  Ms. Camelia Santillan is a 30 year old  at 20w4d by LMP=9w4d U/S, who presents for cervical cerclage.  She denies contractions, vaginal bleeding, and loss of fluid.     Camelia has a history of 3 prior  births and 19 week fetal loss, please see [facility] consult note on 25 for full details. She has been monitored with serial cervical length screening ultrasounds this pregnancy. On  it was difficult to visualize the CL transvaginally, therefore she had a speculum exam performed with a normal appearing anterior ectocervix, but difficult to visualize the posterior cervix. She was digitally checked and found to be ~fingertip dilated with a defect in the right lateral cervix. She was recommended to have an exam indicated cerclage at that time, but declined. She was prescribed vaginal progesterone to be used nightly, and recommended to come back in 1 week for a repeat CL U/S.     She returned for repeat cervical length U/S on 3/7. CL was stable at 25.2mm. Her internal os was digitally checked and found to be closed. She had not started vaginal progesterone yet. She then stated her desire to proceed with cerclage. She now presents today for history indicated cervical cerclage placement.     Pregnancy Complications:  1.  3  births between 28-32 weeks  2.  1 abruption and delivery at 19w6d   3.  1x C/S with likely \"T\" uterine hysterotomy incision (procedure performed in [facility])    Prenatal Labs:   Recent Results (from the past 720 hours)   [facility] Non-Invasive Prenatal Screening-Prequel    Collection Time: 25  3:25 PM   Result Value Ref Range    See Scanned Result       [facility] NON-INVASIVE PRENATAL SCREENING PREQUEL-Scanned   CBC with platelets    Collection Time: 25 10:26 AM   Result " Value Ref Range    WBC Count 6.0 4.0 - 11.0 10e3/uL    RBC Count 3.65 (L) 3.80 - 5.20 10e6/uL    Hemoglobin 12.0 11.7 - 15.7 g/dL    Hematocrit 35.7 35.0 - 47.0 %    MCV 98 78 - 100 fL    MCH 32.9 26.5 - 33.0 pg    MCHC 33.6 31.5 - 36.5 g/dL    RDW 12.4 10.0 - 15.0 %    Platelet Count 230 150 - 450 10e3/uL   Adult Type and Screen    Collection Time: 25 10:26 AM   Result Value Ref Range    ABO/RH(D) O POS     Antibody Screen Negative Negative    SPECIMEN EXPIRATION DATE [id number]    UA with Microscopic reflex to Culture    Collection Time: 25 10:54 AM    Specimen: Urine, Clean Catch   Result Value Ref Range    Color Urine Light Yellow Colorless, Straw, Light Yellow, Yellow    Appearance Urine Clear Clear    Glucose Urine Negative Negative mg/dL    Bilirubin Urine Negative Negative    Ketones Urine Negative Negative mg/dL    Specific Gravity Urine 1.021 1.003 - 1.035    Blood Urine Negative Negative    pH Urine 6.5 5.0 - 7.0    Protein Albumin Urine Negative Negative mg/dL    Urobilinogen Urine Normal Normal, 2.0 mg/dL    Nitrite Urine Negative Negative    Leukocyte Esterase Urine Negative Negative    Mucus Urine Present (A) None Seen /LPF    RBC Urine <1 <=2 /HPF    WBC Urine <1 <=5 /HPF    Squamous Epithelials Urine 4 (H) <=1 /HPF     OB History    Para Term  AB Living   5 3 0 3 1 3   SAB IAB Ectopic Multiple Live Births   0 1 0 0 3      # Outcome Date GA Lbr Larry/2nd Weight Sex Type Anes PTL Lv   5 Current            4 IAB 2023w0d    IAB Gen        Birth Comments: bleeding previa/hemorrhage elected for termination      Complications: Placenta Previa, Hemorrhage, Abruptio Placenta   3  18 30w0d   M CS-Unspec   RYLIE      Birth Comments: in [facility] in hospital   2  14 30w0d  2.4 kg (5 lb 4.7 oz) M Vag-Spont   RYLIE      Birth Comments: in hospital in [facility]   1  13 28w0d   M Vag-Spont   RYLIE      Birth Comments: In hospital in [facility] placentia  previa       PMHx:   History reviewed. No pertinent past medical history.    PSHx:   Past Surgical History:   Procedure Laterality Date     SECTION  2018     Meds:   Current Facility-Administered Medications   Medication Dose Route Frequency Provider Last Rate Last Admin    dexAMETHasone (DECADRON) injection 4 mg  4 mg Intravenous Once PRN Bautista Bruno MD        lactated ringers BOLUS 250 mL  250 mL Intravenous Once PRN Bautista Bruno MD        lactated ringers BOLUS 500 mL  500 mL Intravenous Once PRN Bautista Bruno MD   Rate Change at 25 1148    lactated ringers infusion   Intravenous Continuous PRN Bautista Bruno MD        lactated ringers infusion   Intravenous Continuous Bautista Bruno MD   Stopped at 25 1148    metoclopramide (REGLAN) tablet 10 mg  10 mg Oral Q6H PRN Bautista Bruno MD        Or    metoclopramide (REGLAN) injection 10 mg  10 mg Intravenous Q6H PRN Bautista Bruno MD        nalbuphine (NUBAIN) injection 2.5-5 mg  2.5-5 mg Intravenous Q6H PRN Bautista Bruno MD        naloxone (NARCAN) injection 0.1 mg  0.1 mg Intravenous Q2 Min PRN Bautista Bruno MD        ondansetron (ZOFRAN ODT) ODT tab 4 mg  4 mg Oral Q6H PRN Bautista Bruno MD        Or    ondansetron (ZOFRAN) injection 4 mg  4 mg Intravenous Q6H PRN Bautista Bruno MD        ondansetron (ZOFRAN ODT) ODT tab 4 mg  4 mg Oral Q30 Min PRN Bautista Bruno MD        Or    ondansetron (ZOFRAN) injection 4 mg  4 mg Intravenous Q30 Min PRN Bautista Bruno MD        phenylephrine (ABRAM-SYNEPHRINE) injection 100 mcg  100 mcg Intravenous Q5 Min PRN Bautista Bruno MD        prochlorperazine (COMPAZINE) tablet 10 mg  10 mg Oral Q6H PRN Bautista Bruno MD        Or    prochlorperazine (COMPAZINE) injection 10 mg  10 mg Intravenous Q6H PRN Bautista Bruno MD        prochlorperazine (COMPAZINE) injection 5 mg  5 mg Intravenous Q6H PRN Bautista Bruno MD     "    sodium citrate-citric acid (BICITRA) 500-334 MG/5ML solution              Allergies:  Penicillins - rash   FmHx: No family history on file.  SocHx: She denies any tobacco, alcohol, or other drug use during this pregnancy.    PE:  Vit: Vital signs:  Temp: 97.9  F (36.6  C) Temp src: Oral BP: 101/57     Resp: 18   O2 Device: None (Room air)   Height: 149 cm (4' 10.66\") Weight: 52.2 kg (115 lb 1.6 oz)  Estimated body mass index is 23.52 kg/m  as calculated from the following:    Height as of this encounter: 1.49 m (4' 10.66\").    Weight as of this encounter: 52.2 kg (115 lb 1.6 oz).    Gen: Well-appearing, NAD, comfortable   CV: Well perfused  Pulm: Breathing comfortably on room air    Abd: Soft, gravid, non-tender  Ext: LE edema b/l       Assessment/Plan  Camelia Santillan is a 30 year old  female at 20w4d by LMP=9w4d U/S, who presents for history indicated cervical cerclage.  Her pregnancy has otherwise complicated by: 3 prior  births and a 19w loss/delivery secondary to placental abruption.    History indicated Cerclage  We reviewed her prior  births with possible early cervical dilation, and we recommended proceeding with a history indicated cerclage at this time. We discussed that her cervix is on the shorter side, measuring between 24-29mm over the past 2 weeks, most recently measuring 25.2mm, and that with a shorter cervix she has a higher risk of ascending infection throughout her pregnancy, but that this risk is not increased by cerclage placement. We discussed that her cervix has been possibly a fingertip dilated, and if she is found to be so today, will stay for [facility] protocol (indocin and Ancef). We discussed the risk of bleeding, infection, or possible damage to surrounding organs. She was also counseled on the up to 80% chance of success rate of pregnancy prolongation up to planned cerclage removal time at 36 weeks when one is placed in a history indicated " setting. However, there is a possibility of placing the cerclage and it ultimately not preventing a previable, periviable, or  birth. After counseling she would like to proceed with cerclage placement.     - Discussed risks of a cerclage including but not limited to: bleeding (including placental), infection (including chorioamnionitis), damage to surrounding structures including but not limited to bowel, bladder, cervix, risk of PPROM, failure of cerclage to prevent pre-viable, adrián-viable or  birth. Reviewed that usually the cerclage is removed at 36 weeks unless indicated sooner. This can be done at the time of her repeat C/S due to history of T-incision hysterotomy.    - Will plan to check her cervix after spinal anesthesia in the OR. If she is dilated on digital exam, we will plan on [facility] exam indicated cerclage protocol with 3 doses of Ancef/Indocin adrián and post-op  - Doptones before and after the procedure  - Surgical and blood consents signed  - CBC, T&S  - GCCT, Wet prep, U/A with reflex to mirco and culture  - NPO at this time  - Anesthesia alerted    The patient was discussed with Dr. Bonner, who is in agreement with the treatment plan.    In-person [place] interpretor used throughout encounter and for consent process.     Yaneli Watson MD  Maternal Fetal Medicine Fellow

## 2025-03-14 NOTE — PROVIDER NOTIFICATION
03/14/25 1329   Vitals   Oximeter Heart Rate 63 bpm   Pulse 63   BP (!) 87/59   BP - Mean 69   Resp 16     Discussed BP with YOSELIN Bruno upon arrival to PACU. YOSELIN reported pt's BP runs a little lower and is ok with her BP being slightly lower as long as patient is asymptomatic and MAP is >60. Patient currently asymptomatic. Will continue to monitor BP.

## 2025-03-14 NOTE — PROGRESS NOTES
Patient arrived for cerclage placement. Denies VB, LOF and cramping/bessie. Endorses +FM. Patient here with her spouse. In person  interpreting. MD RODERICK Handley and RODERICK Watson informed of patient arrival.

## 2025-03-14 NOTE — OP NOTE
Operative Note: Cervical Cerclage         Pre-Op Diagnosis:   1) Single intrauterine pregnancy at 20w4d by LMP=9w4d U/S   2) Cervical insufficiency by 3 prior  births (28-32 weeks) and D&E at 19w due to placental abruption. Cervix short on ultrasound (24-29mm on serial cervical measurement) and possibly fingertip dilated on physical exam          Post-Op Diagnosis:   1) Same         Procedure:   1) History indicated transvaginal Leslie cervical cerclage, 1 suture (knot tied at 12 o'clock position)         Surgeons:   Attending: Ngozi Bonner MD  Fellow: Yaneli Watson MD, M Fellow  Resident: THANIA Vila2          Anesthesia:   Spinal          Estimated Blood Loss:   10cc         Findings:   1) Cervix was digitally closed prior to the procedure, closed following the procedure  2) Fetal heart tones confirmed by doptone following the procedure         Specimens:   1) GCCT and wet prep swabs          Complications:   1) None apparent          History:     Camelia Santillan is a 30 year old  at 20w4d by LMP=9w4d U/S.  Camelia has a history of 3x  deliveries, likely due to cervical insufficiency, and a history of a 19 week delivery likely due to placental abruption.  After counseling regarding these findings, the patient has decided to proceed with history and possible exam indicated cerclage. We discussed possibility of staying in-house overnight for Ancef and indocin if she is found to have a dilated cervix at time of procedure.          Details of Procedure:   After administration of spinal anesthesia the patient was placed in the dorsal lithotomy position and prepped and draped in the usual fashion. The vagina and cervix were copiously cleansed with betadine solution. The bladder was drained of clear yellow urine. A weighted speculum was placed into the vagina and retractors were used to visualize the cervix.     The anterior lip of the cervix was grasped with a ring forcep.  A  circumferential suture of #2 Ethilon was placed in the usual fashion at the cervicovaginal reflection with knot tied at 12 o'clock position. This suture was securely tied down and digital exam confirmed that the cervix was closed.    The bladder was again drained of clear urine and a rectal exam confirmed that no sutures were present in the rectum.    The cervix and vaginal vault were inspected and noted to be free of injury and hemostatic.      The instruments were removed from the vagina. She tolerated her spinal anesthesia well without incident. She was subsequently transferred to the recovery room in satisfactory condition.    Sponge and needle counts were correct at the close of the case x 2.    Dr. Ngozi Bonner was present and scrubbed throughout the entire procedure.     Yaneli Watson MD  Maternal Fetal Medicine Fellow  3/14/2025 1:30 PM

## 2025-03-14 NOTE — DISCHARGE INSTRUCTIONS
Aprenda cuándo llamar a osorio médico jeimy el embarazo (después de 20 semanas)  Learning About When to Call Your Doctor During Pregnancy (After 20 Weeks)  Instrucciones de cuidado  Es normal que tenga inquietudes acerca de lo que podría ser un problema jeimy el embarazo. Aunque la mayoría de las mujeres embarazadas no tienen ningún problema grave, es importante saber cuándo llamar a osorio médico si tiene determinados síntomas o señales de trabajo de parto.  Estas son algunas sugerencias generales. Osorio médico puede darle más información sobre cuándo llamar.  Cuándo llamar a osorio médico (después de 20 semanas)  Llame al 911  en cualquier momento que considere que necesita atención de urgencia. Por ejemplo, llame si:  Tiene sangrado vaginal intenso.  Tiene dolor repentino e intenso en el abdomen.  Se desmayó (perdió el conocimiento).  Tiene eva convulsión.  Ve o siente el cordón umbilical.  Nisha que está a punto de seth a chan a osorio bebé y no puede llegar en forma snell al hospital.  Llame a osorio médico ahora mismo o busque atención médica inmediata si:  Tiene sangrado vaginal.  Tiene dolor en el abdomen.  Tiene fiebre.  Tiene síntomas de preeclampsia, anselmo:  Hinchazón repentina de la sandra, las julio o los pies.  Nuevos problemas de visión (anselmo oscurecimiento, gabriel borroso o gabriel puntos).  Dolor de cherry intenso.  Tiene eva pérdida repentina de líquido por la vagina. (Piensa que rompió la sandhya).  Piensa que puede chuck comenzado el trabajo de parto. Tallula significa que ha tenido al menos 6 contracciones en eva hora.  Nota que osorio bebé ha dejado de moverse o lo hace mucho menos de lo habitual.  Tiene síntomas de eva infección urinaria. Estos pueden incluir:  Dolor o ardor al orinar.  Necesidad de orinar con frecuencia sin poder eliminar mucha orina.  Dolor en el flanco, que se encuentra ange debajo de la caja torácica y arriba de la cintura en un lado de la espalda.  Yuval en la orina.  Preste especial atención a los cambios  "en osorio franko y asegúrese de comunicarse con osorio médico si:  Tiene flujo vaginal con un olor desagradable.  Tiene cambios en la piel, tales anselmo:  Salpullido.  Comezón.  Color amarillento en la piel.  Tiene otras inquietudes acerca de osorio embarazo.  Si tiene signos de trabajo de parto al llegar a las 37 semanas o más  Si tiene señales de trabajo de parto a las 37 semanas o más, es posible que osorio médico le diga que llame cuando osorio trabajo de parto se vuelva más activo. Los síntomas del trabajo de parto activo incluyen:  Contracciones que son regulares.  Contracciones a intervalos de menos de 5 minutos.  Contracciones jeimy las cuales es difícil hablar.  La atención de seguimiento es eva parte clave de osorio tratamiento y seguridad. Asegúrese de hacer y acudir a todas las citas, y llame a osorio médico si está teniendo problemas. También es eva buena idea saber los resultados de galileo exámenes y mantener eva lista de los medicamentos que juancarlos.   Dónde puede encontrar más información en inglés?  Vaya a [email or web address].net/patientedes  Escriba N531 en la búsqueda para aprender más acerca de \"Aprenda cuándo llamar a osorio médico jeimy el embarazo (después de 20 semanas).\"  Revisado: 30 abril, 2024  Versión del contenido: 14.4    [id number] [employer].   Las instrucciones de cuidado fueron adaptadas bajo licencia por osorio profesional de atención médica. Si usted tiene preguntas sobre eva afección médica o sobre estas instrucciones, siempre pregunte a osorio profesional de franko. [employer] niega toda garantía o responsabilidad por osorio uso de esta información.    "

## 2025-03-14 NOTE — ANESTHESIA PROCEDURE NOTES
"Intrathecal injection Procedure Note    Pre-Procedure   Staff -        Anesthesiologist:  Blanca Orlando MD       Resident/Fellow: Bautista Bruno MD       Performed By: resident       Location: OB       Pre-Anesthestic Checklist: patient identified, IV checked, risks and benefits discussed, informed consent, monitors and equipment checked, pre-op evaluation, at physician/surgeon's request and post-op pain management  Timeout:       Correct Patient: Yes        Correct Procedure: Yes        Correct Site: Yes        Correct Position: Yes   Procedure Documentation  Procedure: intrathecal injection         Patient Position: sitting       Skin prep: Chloraprep       Insertion Site: L3-4. (midline approach).       Needle Gauge: 25.        Needle Length (Inches): 3.5        Spinal Needle Type: Pencan       Introducer used       Introducer: 20 G       # of attempts: 1 and  # of redirects:  0    Assessment/Narrative         Paresthesias: No.       CSF fluid: clear.       Opening pressure was cmH2O while  Sitting.      Medication(s) Administered   0.75% Hyperbaric Bupivacaine (Intrathecal) - Intrathecal   1.2 mL - 3/14/2025 12:47:00 PM    FOR [facility] ([facility]) ONLY:   Pain Team Contact information: please page the Pain Team Via Car reviews. Search \"Pain\". During daytime hours, please page the attending first. At night please page the resident first.      "

## 2025-03-14 NOTE — ANESTHESIA CARE TRANSFER NOTE
Patient: Camelia Santillan    Procedure: Procedure(s):  CERCLAGE, CERVIX, VAGINAL APPROACH       Diagnosis: Cervical insufficiency during pregnancy, antepartum [O34.30]  Diagnosis Additional Information: No value filed.    Anesthesia Type:   Spinal     Note:    Oropharynx: oropharynx clear of all foreign objects and spontaneously breathing  Level of Consciousness: awake  Oxygen Supplementation: room air  Level of Supplemental Oxygen (L/min / FiO2): 6  Independent Airway: airway patency satisfactory and stable  Dentition: dentition unchanged  Vital Signs Stable: post-procedure vital signs reviewed and stable  Report to RN Given: handoff report given  Patient transferred to: PACU    Handoff Report: Identifed the Patient, Identified the Reponsible Provider, Reviewed the pertinent medical history, Discussed the surgical course, Reviewed Intra-OP anesthesia mangement and issues during anesthesia, Set expectations for post-procedure period and Allowed opportunity for questions and acknowledgement of understanding      Vitals:  Vitals Value Taken Time   BP 87/59 03/14/25 1328   Temp     Pulse 63 03/14/25 1332   Resp 16 03/14/25 1332   SpO2 99 % 03/14/25 1332   Vitals shown include unfiled device data.    Electronically Signed By: Bautista Bruno MD  March 14, 2025  1:33 PM

## 2025-03-15 LAB
C TRACH DNA SPEC QL PROBE+SIG AMP: NEGATIVE
N GONORRHOEA DNA SPEC QL NAA+PROBE: NEGATIVE
SPECIMEN TYPE: NORMAL

## 2025-03-19 NOTE — ANESTHESIA POSTPROCEDURE EVALUATION
Patient: Camelia Santillan    Procedure: Procedure(s):  CERCLAGE, CERVIX, VAGINAL APPROACH       Anesthesia Type:  Spinal    Note:  Disposition: Outpatient   Postop Pain Control: Uneventful            Sign Out: Well controlled pain   PONV: No   Neuro/Psych: Uneventful            Sign Out: Acceptable/Baseline neuro status   Airway/Respiratory: Uneventful            Sign Out: Acceptable/Baseline resp. status   CV/Hemodynamics: Uneventful            Sign Out: Acceptable CV status; No obvious hypovolemia; No obvious fluid overload   Other NRE: NONE   DID A NON-ROUTINE EVENT OCCUR? No           Last vitals:  Vitals Value Taken Time   BP 97/64 03/14/25 1430   Temp 36.6  C (97.9  F) 03/14/25 1417   Pulse 64 03/14/25 1430   Resp 14 03/14/25 1430   SpO2 99 % 03/14/25 1430       Electronically Signed By: Blanca Orlando MD  March 19, 2025  8:37 AM

## 2025-03-21 ENCOUNTER — HOSPITAL ENCOUNTER (OUTPATIENT)
Dept: ULTRASOUND IMAGING | Facility: CLINIC | Age: 30
Discharge: HOME | End: 2025-03-21
Attending: STUDENT IN AN ORGANIZED HEALTH CARE EDUCATION/TRAINING PROGRAM | Admitting: STUDENT IN AN ORGANIZED HEALTH CARE EDUCATION/TRAINING PROGRAM
Payer: MEDICAID

## 2025-03-21 DIAGNOSIS — O09.899 HX OF PRETERM DELIVERY, CURRENTLY PREGNANT: ICD-10-CM

## 2025-03-21 DIAGNOSIS — O09.91 HIGH-RISK PREGNANCY IN FIRST TRIMESTER: ICD-10-CM

## 2025-03-21 DIAGNOSIS — Z87.51 HISTORY OF PRETERM DELIVERY: ICD-10-CM

## 2025-03-21 PROCEDURE — 76811 OB US DETAILED SNGL FETUS: CPT | Mod: 26 | Performed by: OBSTETRICS & GYNECOLOGY

## 2025-03-21 PROCEDURE — 76811 OB US DETAILED SNGL FETUS: CPT

## 2025-03-27 ENCOUNTER — TELEPHONE (OUTPATIENT)
Dept: OBGYN | Facility: CLINIC | Age: 30
End: 2025-03-27
Payer: MEDICAID

## 2025-03-27 NOTE — TELEPHONE ENCOUNTER
----- Message from Charlee Hill sent at 3/27/2025  9:18 AM CDT -----  Regarding: RE: pt needs MD appt  Cirilo Mcmullen,   I think I just sent a reply but can't see it.  At this point just scheduling a delivery planning with [facility] MD visit in early 3rd trimester.  She will need a cerclage removal/Repeat C/S 36 wks per MFM   Possible Tubal ligation if pt desires  Have not had this discussion yet.      THanks, Lashawn Hill  ----- Message -----  From: Kemi Rodas  Sent: 3/26/2025  12:42 PM CDT  To: WERO Marquez CNM  Subject: RE: pt needs MD appt                             Just wanting to clarify,   Are we scheduling patient for a YAKOV? Or just a consult after week 28 and plan to deliver with us?  ----- Message -----  From: Charlee Hill APRN CNM  Sent: 3/26/2025  12:25 PM CDT  To: [facility]  Subject: pt needs MD appt                                 [facility] pt   at 22 wks  will need a [facility] MD visit to discuss delivery planning after 28 wks   DUE DATE is 25       She has a cerclage and will need a repeat C/S/cerclage removal due to T incision @ 36 wks     We have not discussed if she wants Tubal ligation yet.

## 2025-04-23 ENCOUNTER — LAB REQUISITION (OUTPATIENT)
Dept: LAB | Facility: CLINIC | Age: 30
End: 2025-04-23
Payer: MEDICAID

## 2025-04-23 DIAGNOSIS — O09.91 SUPERVISION OF HIGH RISK PREGNANCY, UNSPECIFIED, FIRST TRIMESTER: ICD-10-CM

## 2025-04-23 PROCEDURE — 82306 VITAMIN D 25 HYDROXY: CPT | Mod: ORL | Performed by: MIDWIFE

## 2025-04-23 PROCEDURE — 86780 TREPONEMA PALLIDUM: CPT | Mod: ORL | Performed by: MIDWIFE

## 2025-04-24 LAB
T PALLIDUM AB SER QL: NONREACTIVE
VIT D+METAB SERPL-MCNC: 23 NG/ML (ref 20–50)

## 2025-05-08 ENCOUNTER — RESULTS FOLLOW-UP (OUTPATIENT)
Dept: OBGYN | Facility: CLINIC | Age: 30
End: 2025-05-08

## 2025-05-08 ENCOUNTER — HOSPITAL ENCOUNTER (OUTPATIENT)
Dept: ULTRASOUND IMAGING | Facility: CLINIC | Age: 30
Discharge: HOME | End: 2025-05-08
Attending: OBSTETRICS & GYNECOLOGY
Payer: COMMERCIAL

## 2025-05-08 ENCOUNTER — OFFICE VISIT (OUTPATIENT)
Dept: MATERNAL FETAL MEDICINE | Facility: CLINIC | Age: 30
End: 2025-05-08
Attending: OBSTETRICS & GYNECOLOGY
Payer: COMMERCIAL

## 2025-05-08 DIAGNOSIS — O35.9XX0 SUSPECTED FETAL ANOMALY, ANTEPARTUM, SINGLE OR UNSPECIFIED FETUS: ICD-10-CM

## 2025-05-08 DIAGNOSIS — O35.9XX0 SUSPECTED FETAL ANOMALY, ANTEPARTUM, SINGLE OR UNSPECIFIED FETUS: Primary | ICD-10-CM

## 2025-05-08 DIAGNOSIS — Z87.51 HISTORY OF PRETERM DELIVERY: ICD-10-CM

## 2025-05-08 PROCEDURE — 76816 OB US FOLLOW-UP PER FETUS: CPT

## 2025-05-08 NOTE — PROGRESS NOTES
"Please see \"Imaging\" tab under \"Chart Review\" for details of today's visit.    Marguerite Simmons MD    "

## 2025-05-08 NOTE — NURSING NOTE
Patient reports positive fetal movement, denies pain, leaking of fluid, or bleeding. Education provided to patient on today's ultrasound.  SBAR given to KAREEN CARCAMO, see their note in Epic.      #[id number] via Ipad used throughout today's appt.

## 2025-06-03 ENCOUNTER — HOSPITAL ENCOUNTER (INPATIENT)
Facility: CLINIC | Age: 30
LOS: 1 days | Discharge: HOME | End: 2025-06-04
Attending: STUDENT IN AN ORGANIZED HEALTH CARE EDUCATION/TRAINING PROGRAM | Admitting: OBSTETRICS & GYNECOLOGY
Payer: COMMERCIAL

## 2025-06-03 DIAGNOSIS — N30.00 ACUTE CYSTITIS WITHOUT HEMATURIA: Primary | ICD-10-CM

## 2025-06-03 PROBLEM — O47.00 PRETERM CONTRACTIONS: Status: ACTIVE | Noted: 2025-06-03

## 2025-06-03 LAB
ABO + RH BLD: NORMAL
ALBUMIN SERPL BCG-MCNC: 3 G/DL (ref 3.5–5.2)
ALBUMIN UR-MCNC: NEGATIVE MG/DL
ALP SERPL-CCNC: 136 U/L (ref 40–150)
ALT SERPL W P-5'-P-CCNC: 14 U/L (ref 0–50)
ANION GAP SERPL CALCULATED.3IONS-SCNC: 11 MMOL/L (ref 7–15)
APPEARANCE UR: ABNORMAL
AST SERPL W P-5'-P-CCNC: 16 U/L (ref 0–45)
BACTERIA #/AREA URNS HPF: ABNORMAL /HPF
BASOPHILS # BLD AUTO: 0 10E3/UL (ref 0–0.2)
BASOPHILS NFR BLD AUTO: 0 %
BILIRUB SERPL-MCNC: 0.9 MG/DL
BILIRUB UR QL STRIP: NEGATIVE
BLD GP AB SCN SERPL QL: NEGATIVE
BUN SERPL-MCNC: 6.2 MG/DL (ref 6–20)
CALCIUM SERPL-MCNC: 8 MG/DL (ref 8.8–10.4)
CHLORIDE SERPL-SCNC: 103 MMOL/L (ref 98–107)
CLUE CELLS: ABNORMAL
COLOR UR AUTO: YELLOW
CREAT SERPL-MCNC: 0.39 MG/DL (ref 0.51–0.95)
EGFRCR SERPLBLD CKD-EPI 2021: >90 ML/MIN/1.73M2
EOSINOPHIL # BLD AUTO: 0.1 10E3/UL (ref 0–0.7)
EOSINOPHIL NFR BLD AUTO: 1 %
ERYTHROCYTE [DISTWIDTH] IN BLOOD BY AUTOMATED COUNT: 12.4 % (ref 10–15)
ERYTHROCYTE [DISTWIDTH] IN BLOOD BY AUTOMATED COUNT: 12.5 % (ref 10–15)
GLUCOSE SERPL-MCNC: 82 MG/DL (ref 70–99)
GLUCOSE UR STRIP-MCNC: NEGATIVE MG/DL
HCO3 SERPL-SCNC: 20 MMOL/L (ref 22–29)
HCT VFR BLD AUTO: 32.3 % (ref 35–47)
HCT VFR BLD AUTO: 32.7 % (ref 35–47)
HGB BLD-MCNC: 10.9 G/DL (ref 11.7–15.7)
HGB BLD-MCNC: 11 G/DL (ref 11.7–15.7)
HGB UR QL STRIP: NEGATIVE
HOLD SPECIMEN: NORMAL
IMM GRANULOCYTES # BLD: 0 10E3/UL
IMM GRANULOCYTES NFR BLD: 1 %
KETONES UR STRIP-MCNC: 40 MG/DL
LEUKOCYTE ESTERASE UR QL STRIP: ABNORMAL
LYMPHOCYTES # BLD AUTO: 0.7 10E3/UL (ref 0.8–5.3)
LYMPHOCYTES NFR BLD AUTO: 11 %
MCH RBC QN AUTO: 32.4 PG (ref 26.5–33)
MCH RBC QN AUTO: 32.4 PG (ref 26.5–33)
MCHC RBC AUTO-ENTMCNC: 33.6 G/DL (ref 31.5–36.5)
MCHC RBC AUTO-ENTMCNC: 33.7 G/DL (ref 31.5–36.5)
MCV RBC AUTO: 96 FL (ref 78–100)
MCV RBC AUTO: 97 FL (ref 78–100)
MONOCYTES # BLD AUTO: 0.3 10E3/UL (ref 0–1.3)
MONOCYTES NFR BLD AUTO: 4 %
MUCOUS THREADS #/AREA URNS LPF: PRESENT /LPF
NEUTROPHILS # BLD AUTO: 5.3 10E3/UL (ref 1.6–8.3)
NEUTROPHILS NFR BLD AUTO: 84 %
NITRATE UR QL: NEGATIVE
NRBC # BLD AUTO: 0 10E3/UL
NRBC BLD AUTO-RTO: 0 /100
PH UR STRIP: 6.5 [PH] (ref 5–7)
PLATELET # BLD AUTO: 204 10E3/UL (ref 150–450)
PLATELET # BLD AUTO: 211 10E3/UL (ref 150–450)
POTASSIUM SERPL-SCNC: 3.5 MMOL/L (ref 3.4–5.3)
PROT SERPL-MCNC: 5.9 G/DL (ref 6.4–8.3)
RBC # BLD AUTO: 3.36 10E6/UL (ref 3.8–5.2)
RBC # BLD AUTO: 3.39 10E6/UL (ref 3.8–5.2)
RBC URINE: 2 /HPF
SODIUM SERPL-SCNC: 134 MMOL/L (ref 135–145)
SP GR UR STRIP: 1.02 (ref 1–1.03)
SPECIMEN EXP DATE BLD: NORMAL
SQUAMOUS EPITHELIAL: 23 /HPF
TRANSITIONAL EPI: <1 /HPF
TRICHOMONAS, WET PREP: ABNORMAL
UROBILINOGEN UR STRIP-MCNC: NORMAL MG/DL
WBC # BLD AUTO: 6.3 10E3/UL (ref 4–11)
WBC # BLD AUTO: 8.4 10E3/UL (ref 4–11)
WBC URINE: 19 /HPF
WBC'S/HIGH POWER FIELD, WET PREP: ABNORMAL
YEAST, WET PREP: ABNORMAL

## 2025-06-03 PROCEDURE — 96374 THER/PROPH/DIAG INJ IV PUSH: CPT | Mod: 6MC

## 2025-06-03 PROCEDURE — 99207 PR NO BILLABLE SERVICE THIS VISIT: CPT | Performed by: STUDENT IN AN ORGANIZED HEALTH CARE EDUCATION/TRAINING PROGRAM

## 2025-06-03 PROCEDURE — 85025 COMPLETE CBC W/AUTO DIFF WBC: CPT

## 2025-06-03 PROCEDURE — 87591 N.GONORRHOEAE DNA AMP PROB: CPT

## 2025-06-03 PROCEDURE — G0463 HOSPITAL OUTPT CLINIC VISIT: HCPCS

## 2025-06-03 PROCEDURE — 81001 URINALYSIS AUTO W/SCOPE: CPT

## 2025-06-03 PROCEDURE — 250N000013 HC RX MED GY IP 250 OP 250 PS 637

## 2025-06-03 PROCEDURE — 85027 COMPLETE CBC AUTOMATED: CPT

## 2025-06-03 PROCEDURE — 36415 COLL VENOUS BLD VENIPUNCTURE: CPT

## 2025-06-03 PROCEDURE — 258N000003 HC RX IP 258 OP 636: Performed by: STUDENT IN AN ORGANIZED HEALTH CARE EDUCATION/TRAINING PROGRAM

## 2025-06-03 PROCEDURE — 258N000003 HC RX IP 258 OP 636

## 2025-06-03 PROCEDURE — 96361 HYDRATE IV INFUSION ADD-ON: CPT

## 2025-06-03 PROCEDURE — 87210 SMEAR WET MOUNT SALINE/INK: CPT

## 2025-06-03 PROCEDURE — 250N000011 HC RX IP 250 OP 636

## 2025-06-03 PROCEDURE — 80053 COMPREHEN METABOLIC PANEL: CPT

## 2025-06-03 PROCEDURE — 87086 URINE CULTURE/COLONY COUNT: CPT

## 2025-06-03 PROCEDURE — 96360 HYDRATION IV INFUSION INIT: CPT

## 2025-06-03 PROCEDURE — 120N000002 HC R&B MED SURG/OB UMMC

## 2025-06-03 PROCEDURE — 86901 BLOOD TYPING SEROLOGIC RH(D): CPT

## 2025-06-03 PROCEDURE — 87491 CHLMYD TRACH DNA AMP PROBE: CPT

## 2025-06-03 PROCEDURE — 96375 TX/PRO/DX INJ NEW DRUG ADDON: CPT | Mod: 6MC

## 2025-06-03 PROCEDURE — 87653 STREP B DNA AMP PROBE: CPT

## 2025-06-03 PROCEDURE — 99221 1ST HOSP IP/OBS SF/LOW 40: CPT | Mod: AI | Performed by: OBSTETRICS & GYNECOLOGY

## 2025-06-03 PROCEDURE — 85041 AUTOMATED RBC COUNT: CPT

## 2025-06-03 PROCEDURE — 85014 HEMATOCRIT: CPT

## 2025-06-03 PROCEDURE — 86900 BLOOD TYPING SEROLOGIC ABO: CPT

## 2025-06-03 RX ORDER — SODIUM CHLORIDE, SODIUM LACTATE, POTASSIUM CHLORIDE, CALCIUM CHLORIDE 600; 310; 30; 20 MG/100ML; MG/100ML; MG/100ML; MG/100ML
INJECTION, SOLUTION INTRAVENOUS
Status: COMPLETED
Start: 2025-06-03 | End: 2025-06-03

## 2025-06-03 RX ORDER — ONDANSETRON 2 MG/ML
4 INJECTION INTRAMUSCULAR; INTRAVENOUS EVERY 6 HOURS PRN
Status: DISCONTINUED | OUTPATIENT
Start: 2025-06-03 | End: 2025-06-04 | Stop reason: HOSPADM

## 2025-06-03 RX ORDER — ACETAMINOPHEN 325 MG/1
650 TABLET ORAL EVERY 4 HOURS PRN
Status: DISCONTINUED | OUTPATIENT
Start: 2025-06-03 | End: 2025-06-04 | Stop reason: HOSPADM

## 2025-06-03 RX ORDER — PROCHLORPERAZINE MALEATE 10 MG
10 TABLET ORAL EVERY 6 HOURS PRN
Status: DISCONTINUED | OUTPATIENT
Start: 2025-06-03 | End: 2025-06-04 | Stop reason: HOSPADM

## 2025-06-03 RX ORDER — DOCUSATE SODIUM 100 MG/1
100 CAPSULE, LIQUID FILLED ORAL 2 TIMES DAILY
Status: DISCONTINUED | OUTPATIENT
Start: 2025-06-03 | End: 2025-06-04

## 2025-06-03 RX ORDER — ONDANSETRON 4 MG/1
4 TABLET, ORALLY DISINTEGRATING ORAL EVERY 6 HOURS PRN
Status: DISCONTINUED | OUTPATIENT
Start: 2025-06-03 | End: 2025-06-03

## 2025-06-03 RX ORDER — NITROFURANTOIN 25; 75 MG/1; MG/1
100 CAPSULE ORAL EVERY 12 HOURS SCHEDULED
Status: DISCONTINUED | OUTPATIENT
Start: 2025-06-03 | End: 2025-06-04 | Stop reason: HOSPADM

## 2025-06-03 RX ORDER — ONDANSETRON 4 MG/1
4 TABLET, ORALLY DISINTEGRATING ORAL EVERY 6 HOURS PRN
Status: DISCONTINUED | OUTPATIENT
Start: 2025-06-03 | End: 2025-06-04 | Stop reason: HOSPADM

## 2025-06-03 RX ORDER — PROCHLORPERAZINE MALEATE 10 MG
10 TABLET ORAL EVERY 6 HOURS PRN
Status: DISCONTINUED | OUTPATIENT
Start: 2025-06-03 | End: 2025-06-03

## 2025-06-03 RX ORDER — ACETAMINOPHEN 325 MG/1
650 TABLET ORAL ONCE
Status: COMPLETED | OUTPATIENT
Start: 2025-06-03 | End: 2025-06-03

## 2025-06-03 RX ORDER — SODIUM CHLORIDE, SODIUM LACTATE, POTASSIUM CHLORIDE, CALCIUM CHLORIDE 600; 310; 30; 20 MG/100ML; MG/100ML; MG/100ML; MG/100ML
INJECTION, SOLUTION INTRAVENOUS
Status: DISCONTINUED
Start: 2025-06-03 | End: 2025-06-03 | Stop reason: HOSPADM

## 2025-06-03 RX ORDER — PRENATAL VIT/IRON FUM/FOLIC AC 27MG-0.8MG
1 TABLET ORAL DAILY
Status: DISCONTINUED | OUTPATIENT
Start: 2025-06-04 | End: 2025-06-04 | Stop reason: HOSPADM

## 2025-06-03 RX ORDER — LIDOCAINE 40 MG/G
CREAM TOPICAL
Status: DISCONTINUED | OUTPATIENT
Start: 2025-06-03 | End: 2025-06-03 | Stop reason: HOSPADM

## 2025-06-03 RX ORDER — METOCLOPRAMIDE HYDROCHLORIDE 5 MG/ML
10 INJECTION INTRAMUSCULAR; INTRAVENOUS EVERY 6 HOURS PRN
Status: DISCONTINUED | OUTPATIENT
Start: 2025-06-03 | End: 2025-06-04 | Stop reason: HOSPADM

## 2025-06-03 RX ORDER — METOCLOPRAMIDE 10 MG/1
10 TABLET ORAL EVERY 6 HOURS PRN
Status: DISCONTINUED | OUTPATIENT
Start: 2025-06-03 | End: 2025-06-04 | Stop reason: HOSPADM

## 2025-06-03 RX ORDER — ONDANSETRON 2 MG/ML
4 INJECTION INTRAMUSCULAR; INTRAVENOUS EVERY 6 HOURS PRN
Status: DISCONTINUED | OUTPATIENT
Start: 2025-06-03 | End: 2025-06-03

## 2025-06-03 RX ADMIN — ONDANSETRON 4 MG: 2 INJECTION INTRAMUSCULAR; INTRAVENOUS at 14:52

## 2025-06-03 RX ADMIN — METOCLOPRAMIDE 10 MG: 5 INJECTION, SOLUTION INTRAMUSCULAR; INTRAVENOUS at 13:47

## 2025-06-03 RX ADMIN — ACETAMINOPHEN 650 MG: 325 TABLET ORAL at 13:46

## 2025-06-03 RX ADMIN — NITROFURANTOIN (MONOHYDRATE/MACROCRYSTALS) 100 MG: 75; 25 CAPSULE ORAL at 20:47

## 2025-06-03 RX ADMIN — SODIUM CHLORIDE, SODIUM LACTATE, POTASSIUM CHLORIDE, AND CALCIUM CHLORIDE 1000 ML: .6; .31; .03; .02 INJECTION, SOLUTION INTRAVENOUS at 13:23

## 2025-06-03 RX ADMIN — SODIUM CHLORIDE, SODIUM LACTATE, POTASSIUM CHLORIDE, AND CALCIUM CHLORIDE: .6; .31; .03; .02 INJECTION, SOLUTION INTRAVENOUS at 16:40

## 2025-06-03 ASSESSMENT — ACTIVITIES OF DAILY LIVING (ADL)
ADLS_ACUITY_SCORE: 15
ADLS_ACUITY_SCORE: 19
ADLS_ACUITY_SCORE: 45
ADLS_ACUITY_SCORE: 15
ADLS_ACUITY_SCORE: 15
ADLS_ACUITY_SCORE: 45
ADLS_ACUITY_SCORE: 19
ADLS_ACUITY_SCORE: 45
ADLS_ACUITY_SCORE: 19
ADLS_ACUITY_SCORE: 45
ADLS_ACUITY_SCORE: 45
ADLS_ACUITY_SCORE: 41

## 2025-06-03 NOTE — PROGRESS NOTES
[facility]  OB Triage Note    CC: Nausea, vomiting, abdominal pain    HPI: Camelia Santillan is a 30 year old  at 32w1d by 9w4d US who presents today for nausea, vomiting, diarrhea, and abdominal cramping since 2AM. She  describes the abdominal cramping as being similar to menstral cramps lasting ~20 seconds at a time with a frequency of every 10-15 minutes. Her pain is located near the xyphoid process and radiates around the belly towards the back. She states these do not feel like contractions from her prior deliveries. She reports no changes in vaginal discharge, no blood or increased fluid; no concern for rupture of membranes.Emesis is non-bloody non bilious. Diarrhea is non bloody. She has decreased PO intake and has been able to keep water down but only a small amount (less than a cup) today. ROS is significant for dizziness. ROS is negative for fever, headache, fainting, blurry vision, chest pain, heart palpitation, increased WOB, rashes, peripheral edema,and joint pain.      She reports good fetal movement. Denies LOF, and vaginal bleeding,      Her 12 year old son had similar symptoms yesterday which self resolved.    Obstetric Complications  Cerclage on 3/14   H/o PTD x3  H/o CS with T incision    O:  Patient Vitals for the past 24 hrs:   BP Temp Temp src Resp   25 1145 102/65 98.7  F (37.1  C) Oral 15     Gen: Well-appearing, NAD  CV: Regular rate, well perfused  Pulm: Breathing comfortably on room air  Abd: Soft, gravid, nontender    Spec: Cerclage in place without tension on the strings or blanching of the cervix, some increased vaginal discharge but no pooling, bleeding    FHT: , mod ness, + accels, - decels  [place]: 2-4 ctx in 10 mins    Labs:   Latest Reference Range & Units 25 13:38   Sodium 135 - 145 mmol/L 134 (L)   Potassium 3.4 - 5.3 mmol/L 3.5   Chloride 98 - 107 mmol/L 103   Carbon Dioxide (CO2) 22 - 29 mmol/L 20 (L)   Urea Nitrogen 6.0 - 20.0  mg/dL 6.2   Creatinine 0.51 - 0.95 mg/dL 0.39 (L)   GFR Estimate >60 mL/min/1.73m2 >90   Calcium 8.8 - 10.4 mg/dL 8.0 (L)   Anion Gap 7 - 15 mmol/L 11   Albumin 3.5 - 5.2 g/dL 3.0 (L)   Protein Total 6.4 - 8.3 g/dL 5.9 (L)   Alkaline Phosphatase 40 - 150 U/L 136   ALT 0 - 50 U/L 14   AST 0 - 45 U/L 16   Bilirubin Total <=1.2 mg/dL 0.9   Glucose 70 - 99 mg/dL 82   (L): Data is abnormally low    A/P:  Camelia Santillan is a 30 year old  at 32w1d by 9w4d US here with nausea, vomiting and abdominal discomfort.    # nausea  # vomiting  # diarrhea  # Abdominal pain  Suspect viral etiology given her son having the same symptoms prior. Must assess for  labor given her history of  deliveries as well as her toco. Reassuring that she clearly states this does not feel like that.  - GC/chlam, GBS collected  - wet prep wnl  - speculum exam reassuring with no tension on cerclage  - fluid bolus, antiemetics, and observation    #FWB: - Category 1 FHT    Lyndon Liev MD  Obstetrics & Gynecology, PGY-2  2025 2:50 PM    ADDENDUM:   Camelia has received fluids and passed a PO challenge. She slept and now reports to the RN that she feels better.     In to see the patient. She is now reporting that her GI symptoms are better but that she now can feel discrete contractions. She feels them every 15-30 minutes and they are mildly painful and accompanied with abdominal hardening. She endorses experiencing one during our interview and remained comfortable appearing. Still worrisome for  labor with cerclage in place and prior T'd incision. Will give another fluid bolus and continue to monitor.     FHT: 120 baseline, mod variability, + accels, - decels  [place]: 0-2 ctx in 10 min    Lyndon Live MD  Obstetrics & Gynecology, PGY-2  2025 4:57 PM    Appreciate note by Dr. Live. Patient has been seen and examined by me separate from the resident, agree with above note.     In to see patient with  the resident. Patient states her abdominal cramping has changed from feeling more GI-related to feeling more uterine related. She feels better from GI perspective. Agree with above documentation that patient appeared comfortable during interview and during episode when she was feeling pain and abdominal hardening. Workup unremarkable, exam reassuring earlier in the day. States the pain made this transition around the time of her speculum exam or in the last 30 minutes, difficult to assess timing of onset of pain.     Suspect some cramping due to dehydration from GI illness. Patient discussed with on call MFM in event of requiring admission for observation overnight. On my evaluation of patient discomfort, currently no need for cerclage removal, tocolysis, betamethasone, or magnesium. Resident will check in with patient again after a second bolus. May consider admission for observation otherwise if contractions worsen or other clinical change, may consider cerclage removal, betamethasone, or magnesium. Please note patient does have history of T incision requiring repeat  delivery.     Lyndon Mejia MD  5:48 PM

## 2025-06-03 NOTE — PLAN OF CARE
Patient presented from home with reports of vomiting last night and diarrhea x3 today, and abdominal pain/tenderness. Pt reports dizziness earlier and not eating/drinking yet today d/t illness. Reports low back pain.  Pt has hx pretern birth x3, c/s x1 and cerclage in place. Denies vaginal bleeding or LOF. + FM. States no others sick in home. Pt goes to [facility] clinic, RN notified Josephine Hand CNM of arrival.

## 2025-06-03 NOTE — H&P
OB HISTORY AND PHYSICAL    Patient: Camelia Santillan   MRN#: [id number]  YOB: 1995     HPI: Camelia Santillan is a 30 year old  at 32w1d by 9w4d US who initially presented for nausea/vomiting/diarrhea that has subsequently improved while in triage following fluid bolus (see not by Dr Mejia for full HPI surrounding n/v/d history). However, while undergoing monitoring patient began experiencing persistent contractions that are uncomfortable and occasional painful. Since this started she has denied associated bleeding/spotting or leakage of fluid. Does not feel like it has lessened since receiving fluid bolus.     She denies fever, chills, headache, vision changes, SOB, chest pain, palpitations, RUQ pain, epigastric pain, dysuria, change in vaginal discharge, LE swelling/tenderness.      Pregnancy notable for:   - Leslie cerclage (3/14/25)   - H/o PTD x3  - H/o CS w/ T'd incision    Prenatal Lab Results:  Lab Results   Component Value Date    HCT 32.7 2025    HGB 11.0 2025       Patient Active Problem List    Diagnosis Date Noted    Short cervix during pregnancy in second trimester 2025     Priority: Medium     [facility] 25  digital examination, the external os is  1 cm dilated and the internal os is finger tip. The posterior lip of the cervix feels about 1 cm long and the right lateral aspect of the cervix has a defect in.   Given the exam findings and her history (3  birth <30 weeks and 1 abruption at 19w6d),   strongly recommended placement of a vaginal cerclage at this time.   PT  opted not to proceed with placement of the cerclage given that she was overall feeling well.   MFM discussed the risks of  birth and pregnancy loss at this gestational age due to cervical insufficiency. We also discussed that it is possible that we may not be able to place a cerclage should her cervix further dilate or shorten. She will continue to think  about options for cerclage placement,      Rubella non-immune status, antepartum 01/15/2025     Priority: Medium    High-risk pregnancy in first trimester 2025     Priority: Medium     [facility] CNM  Partner's name: Sterling   [x]Entered on [facility] prenatal list  []Applied for insurance  [x]NOB folder  [x]Dating by 9w4d US  [x]Patient declines 1st tri genetic screening  []Fetal anatomy US ordered  []recommended LD ASA after 12 wks for PRE-E risk  []No increased risk for GDM  []No need for utox in labor  []COVID vaccine completed  []Pap    12-23wks________________________  [x]Rubella immune  [x]Hep B immune   [x]Varicella NOT immune  []Offer AFP after 15 wks  []FLU shot    24-28wk_________________________  []EOB folder  []Labor plans:  []:  []Car seat:  []Breast pump:  []Infant feeding plan:   []Infant pediatrician:  []PP Contraception plan: If tubal,consent date:  []TDAP   []Rhogam if needed, date:    29-35 wk________________________  []TOLAC consent done  []Water birth interest  []GCT  []RSV    36-37 wks______________________   []GBS   []OTC PP meds sent  []PP recovery plans:    38-42 wks______________________  []IOL reason/plans  []Postdates BPP      History of  delivery 2025     Priority: Medium    Uterine scar from previous surgery 2025     Priority: Medium     Low transverse skin incision   TOLD T incision through cervix  told not to labor  will need rpeat C/S      Vitamin D deficiency 2025     Priority: Medium    Maternal varicella, non-immune 2025     Priority: Medium       HISTORY  No past medical history on file.    Past Surgical History:   Procedure Laterality Date    CERCLAGE CERVICAL N/A 3/14/2025    Procedure: CERCLAGE, CERVIX, VAGINAL APPROACH;  Surgeon: Ngozi Bonner MD;  Location: [facility] L+D     SECTION  2018       No family history on file.    Social History     Tobacco Use    Smoking status: Never     Passive exposure: Never    Smokeless  tobacco: Never   Substance Use Topics    Alcohol use: Not Currently    Drug use: Not Currently       Medications Prior to Admission   Medication Sig Dispense Refill Last Dose/Taking    Multiple Vitamin (MULTIVITAMIN ADULT PO) Take 1 tablet by mouth.   6/3/2025 Morning       Allergies   Allergen Reactions    Penicillins Rash        REVIEW OF SYSTEMS:  A 10 point review of systems was completed and was negative other than as noted in the HPI.    PHYSICAL EXAM  Patient Vitals for the past 24 hrs:   BP Temp Temp src Resp   06/03/25 1600 100/56 -- -- --   06/03/25 1145 102/65 98.7  F (37.1  C) Oral 15     Gen: No acute distress, resting comfortably in triage bed, not wincing or reacting to contractions   CV: Regular rate and rhythm   Lungs: Breathing non-labored   Abd: Gravid, non-tender, non-distended  Ext: Trace peripheral extremity edema    Cervix: C/L/H with cerclage in place, not on tension   Membranes: Intact   Estimated Fetal Weight: 7#     FHT:  Monitoring External  FHT: Baseline 125 bpm; moderate variability; accels present; no decelerations  TOCO 1-2 contractions in 10 minutes    Studies: T&S, CBC, RPR  Results for orders placed or performed during the hospital encounter of 06/03/25 (from the past 24 hours)   CBC with platelets   Result Value Ref Range    WBC Count 8.4 4.0 - 11.0 10e3/uL    RBC Count 3.39 (L) 3.80 - 5.20 10e6/uL    Hemoglobin 11.0 (L) 11.7 - 15.7 g/dL    Hematocrit 32.7 (L) 35.0 - 47.0 %    MCV 97 78 - 100 fL    MCH 32.4 26.5 - 33.0 pg    MCHC 33.6 31.5 - 36.5 g/dL    RDW 12.4 10.0 - 15.0 %    Platelet Count 204 150 - 450 10e3/uL   Comprehensive metabolic panel   Result Value Ref Range    Sodium 134 (L) 135 - 145 mmol/L    Potassium 3.5 3.4 - 5.3 mmol/L    Carbon Dioxide (CO2) 20 (L) 22 - 29 mmol/L    Anion Gap 11 7 - 15 mmol/L    Urea Nitrogen 6.2 6.0 - 20.0 mg/dL    Creatinine 0.39 (L) 0.51 - 0.95 mg/dL    GFR Estimate >90 >60 mL/min/1.73m2    Calcium 8.0 (L) 8.8 - 10.4 mg/dL    Chloride 103  98 - 107 mmol/L    Glucose 82 70 - 99 mg/dL    Alkaline Phosphatase 136 40 - 150 U/L    AST 16 0 - 45 U/L    ALT 14 0 - 50 U/L    Protein Total 5.9 (L) 6.4 - 8.3 g/dL    Albumin 3.0 (L) 3.5 - 5.2 g/dL    Bilirubin Total 0.9 <=1.2 mg/dL   Extra Tube    Narrative    The following orders were created for panel order Extra Tube.  Procedure                               Abnormality         Status                     ---------                               -----------         ------                     Extra Serum Separator T...[[id number]]                      Final result                 Please view results for these tests on the individual orders.   Extra Serum Separator Tube (SST)   Result Value Ref Range    Hold Specimen [facility]    Wet preparation    Specimen: Vagina; Swab   Result Value Ref Range    Trichomonas Absent Absent    Yeast Absent Absent    Clue Cells Absent Absent    WBCs/high power field 1+ (A) None   UA Macroscopic with reflex to Microscopic and Culture    Specimen: Urine, Midstream   Result Value Ref Range    Color Urine Yellow Colorless, Straw, Light Yellow, Yellow    Appearance Urine Slightly Cloudy (A) Clear    Glucose Urine Negative Negative mg/dL    Bilirubin Urine Negative Negative    Ketones Urine 40 (A) Negative mg/dL    Specific Gravity Urine 1.019 1.003 - 1.035    Blood Urine Negative Negative    pH Urine 6.5 5.0 - 7.0    Protein Albumin Urine Negative Negative mg/dL    Urobilinogen Urine Normal Normal mg/dL    Nitrite Urine Negative Negative    Leukocyte Esterase Urine Moderate (A) Negative    Bacteria Urine Moderate (A) None Seen /HPF    Mucus Urine Present (A) None Seen /LPF    RBC Urine 2 <=2 /HPF    WBC Urine 19 (H) <=5 /HPF    Squamous Epithelials Urine 23 (H) <=1 /HPF    Transitional Epithelials Urine <1 <=1 /HPF    Narrative    Urine Culture ordered based on laboratory criteria     Assessment & Plan: 30 year old  at 32w1d by 9w4d US, here for resolving nausea/vomiting/diarrhea but  admitted for overnight observation due to persistent  contractions.     #  contractions   # Hx  delivery x3   # Leslie cerclage (placed 3/14/25)   - Admit to observation  - Continuous tocometer, BID NST  - Cervix: Closed/long/high anterior medium, cerclage in place and not on tension   - Regular diet > NPO if contractions become more frequent or painful overnight   - IF ctx increase and/or cervical change > cerclage removal,  corticosteroids, and IV Mg for fetal neuroprotection  - NICU consult PRN if delivery eminent   - Repeat SVE PRN based on clinical indications   - MOD: Due to hx prior T'd uterine incision patient would be repeat  section, discussed indications that would prompt delivery overnight including fetal distress or  labor/cervical dilation requiring cerclage removal, timing/urgency would be dependent on clinical scenario     # UTI   - D# Macrobid BID     # PNC  - Rh positive, Rubella immune, GCT nml, GBS unknown   - Other prenatal labs wnl  - Imaging: placenta anterior, >2cm from cervical os  - Follows with [facility] and [facility]FM groups for PNC      # FWB:   - Continuous tocometer, fetal NST in AM  - Intrauterine resuscitative measures prn    # PPH Risk:  - Medium (IOL, augment with pit, HTN, mag, triple III, prolonged labor, precipitous labor/delivery, h/o abruption, >5 deliveries, fibroids, large baby, poly, multiples, general anesthesia, shoulder dystocia, lacs/epis, op delivery, hematoma)- IV, type and screen    Patient discussed with Dr. Lay Valles MD   Obstetrics & Gynecology, PGY-2  2025 6:47 PM     Appreciate note by Dr. Valles. Patient has been seen and examined by me separate from the resident, agree with above note.     Adrianne Chun MD

## 2025-06-03 NOTE — PROVIDER NOTIFICATION
06/03/25 1617   Provider Notification   Provider Name/Title Dr. Calos Osorio   Method of Notification At Bedside   Request Evaluate in Person   Notification Reason Status Update     OB team to bedside to reevaluate patient status. Patient initially reporting to RN that symptoms, including abdominal/contraction pain improved, but at this time pt stating pain has changed. Reports previously felt like cramping, but now feels more like tightening/abdomen getting hard. Reports hip pain as well. Pt states that the change in contraction began following speculum exam. MD recommending giving additional IV fluids. 1 L LR bolus infusing at this time. RN and MD to return, RN state to let her know if contractions feel stronger/more painful, closer together. Pt understanding.

## 2025-06-03 NOTE — PROVIDER NOTIFICATION
06/03/25 1312   Provider Notification   Provider Name/Title Dr. Melvin Marrero   Method of Notification At Bedside   Request Evaluate in Person   Notification Reason Status Update     Dr. Melvin Marrero to bedside to assess patient. MD reviewing pt symptoms/situation. Recommending speculum exam, pt agreeable. Exam performed, MD states cervix appears closed with no bleeding, cerclage stitch looks without tension. Gonorrhea/chlamydia, wet prep and GBS swabs sent. UA/UC sent. CBC and CMP sent. Fluid bolus of 1 L running. Given tylenol PO and reglan IV given. Plan to await labs and reassess status.

## 2025-06-03 NOTE — PROGRESS NOTES
[facility]  OB HISTORY AND PHYSICAL    Patient: Camelia Santillan   MRN#: [id number]  YOB: 1995     HPI: Camelia Santillan is a 30 year old  at 32w1d by *** who presents today for nausea, vomiting, diarrhea, and abdominal cramping since the evening of 2025. She  describes the abdominal cramping as being similar to menstral cramps lasting ~20 seconds at a time with a frequency of every 10-15 minutes. Her pain is located near the xyphoid process and radiates around the belly towards the back. She reports no changes in vaginal discharge, no blood or increased fluid; no concern for rupture of membranes.Emesis is non-bloody non bilious. Diarrhea is non bloody. She has decreased PO intake and has not been able to keep water down. ROS is significant for dizziness. ROS is negative for fever, headache, fainting, blurry vision, chest pain, heart palpitation, increased WOB, rashes, peripheral edema,and joint pain.     She reports good fetal movement. Denies LOF, and vaginal bleeding,     Her 12 year old son has similar symptoms      Pregnancy notable for:   Cerclage on 3/14    Her previous pregnancies were notable for  spontaneous vaginal delivery at 28 weeks,  spontaneous vaginal delivery at 30 weeks,  CS at 30 weeks, and IAB at 20 weeks; labor occurred in the hospital in [facility] while IAB occurred in the . Her vaginal deliveries and CS were uncomplicated. Her IAB was complicated by placenta previa, hemorrhage, and abruptio placenta. Denies history of postpartum hemorrhage, shoulder dystocia, pre-eclampsia.     No history of asthma or high blood pressure.    OBGYN History:    s/p cerclage on 3/14 with scheduled CS at 36 weeks.  OB History    Para Term  AB Living   5 3 0 3 1 3   SAB IAB Ectopic Multiple Live Births   0 1 0 0 3      # Outcome Date GA Lbr Larry/2nd Weight Sex Type Anes PTL Lv    5 Current            4 IAB  20w0d    IAB Gen        Birth Comments: bleeding previa/hemorrhage elected for termination      Complications: Placenta Previa, Hemorrhage, Abruptio Placenta   3  18 30w0d   M CS-Unspec   RYLIE      Birth Comments: in [facility] in hospital   2  14 30w0d  2.4 kg (5 lb 4.7 oz) M Vag-Spont   RYLIE      Birth Comments: in hospital in [facility]   1  13 28w0d   M Vag-Spont   RYLIE      Birth Comments: In hospital in [facility] placentia previa      - Last Pap: From chart review ~     - Gyn surgeries:  Denies hx of STI, fibroids, ovarian cyst, or abnormal pap smear    Prenatal Lab Results:  Lab Results   Component Value Date    AS Negative 2025    HEPBANG Nonreactive 01/10/2025    CHPCRT Negative 01/15/2025    GCPCRT Negative 01/15/2025    HGB 12.0 2025     GBS Status:   GBS swab performed on 6/3/2025 - results pending    Patient Active Problem List    Diagnosis Date Noted    Short cervix during pregnancy in second trimester 2025     Priority: Medium     MFM 25  digital examination, the external os is  1 cm dilated and the internal os is finger tip. The posterior lip of the cervix feels about 1 cm long and the right lateral aspect of the cervix has a defect in.   Given the exam findings and her history (3  birth <30 weeks and 1 abruption at 19w6d),   strongly recommended placement of a vaginal cerclage at this time.   PT  opted not to proceed with placement of the cerclage given that she was overall feeling well.   MFM discussed the risks of  birth and pregnancy loss at this gestational age due to cervical insufficiency. We also discussed that it is possible that we may not be able to place a cerclage should her cervix further dilate or shorten. She will continue to think about options for cerclage placement,      Rubella non-immune status, antepartum 01/15/2025     Priority: Medium    High-risk pregnancy in first trimester  2025     Priority: Medium     [facility] CNM  Partner's name: Sterling   [x]Entered on [facility] prenatal list  []Applied for insurance  [x]NOB folder  [x]Dating by 9w4d US  [x]Patient declines 1st tri genetic screening  []Fetal anatomy US ordered  []recommended LD ASA after 12 wks for PRE-E risk  []No increased risk for GDM  []No need for utox in labor  []COVID vaccine completed  []Pap    12-23wks________________________  [x]Rubella immune  [x]Hep B immune   [x]Varicella NOT immune  []Offer AFP after 15 wks  []FLU shot    24-28wk_________________________  []EOB folder  []Labor plans:  []:  []Car seat:  []Breast pump:  []Infant feeding plan:   []Infant pediatrician:  []PP Contraception plan: If tubal,consent date:  []TDAP   []Rhogam if needed, date:    29-35 wk________________________  []TOLAC consent done  []Water birth interest  []GCT  []RSV    36-37 wks______________________   []GBS   []OTC PP meds sent  []PP recovery plans:    38-42 wks______________________  []IOL reason/plans  []Postdates BPP      History of  delivery 2025     Priority: Medium    Uterine scar from previous surgery 2025     Priority: Medium     Low transverse skin incision   TOLD T incision through cervix  told not to labor  will need rpeat C/S      Vitamin D deficiency 2025     Priority: Medium    Maternal varicella, non-immune 2025     Priority: Medium       Past Medical History  No past medical history on file.  Past Surgical History  Past Surgical History:   Procedure Laterality Date    CERCLAGE CERVICAL N/A 3/14/2025    Procedure: CERCLAGE, CERVIX, VAGINAL APPROACH;  Surgeon: Ngozi Bonner MD;  Location: [facility] L[facility]D     SECTION  2018     Family History  No family history on file.  Social History  Social History     Tobacco Use    Smoking status: Never     Passive exposure: Never    Smokeless tobacco: Never   Substance Use Topics    Alcohol use: Not Currently     Medications  Medications  Prior to Admission   Medication Sig Dispense Refill Last Dose/Taking    Multiple Vitamin (MULTIVITAMIN ADULT PO) Take 1 tablet by mouth.   6/3/2025 Morning     Allergies  Allergies   Allergen Reactions    Penicillins Rash        REVIEW OF SYSTEMS:  A 10 point review of systems was completed and was negative other than as noted in the HPI.    PHYSICAL EXAM  /65   Temp 98.7  F (37.1  C) (Oral)   Resp 15   LMP 10/20/2024   Gen: NAD, fatigue, lying in bed  CV: RRR, nl S1/S2, no murmurs/clicks/gallops  Lungs: CTAB, non-labored breathing  Abd: Gravid***, non-tender, non-distended  Ext: *** peripheral extremity edema; *** DTRs, *** clonus    SSE: ***  SVE: ***  - Exam chaperoned by ***RN  Membranes: ***  Presentation: *** by ***.  Estimated Fetal Weight: ***    FHT:  Monitoring External  FHT: Baseline *** bpm; *** variability; *** accels; *** decelerations  TOCO *** contractions in 10 minutes    Studies: CBC, BMP, UA, GBS swab, wet mount, chlamydia/gonorrhea testing    Assessment & Plan: 30 year old  at 32w1d admitted for nausea, vomiting, diarrhea, and abdominal cramping of 1 day duration. Pregnancy is notable for cerclage on 3/14 with a CS scheduled at 36 weeks. Differential diagnosis at this time is likely viral gastritis complicated by dehydration triggering premature uterine contractions. Patient  also has a sick contact at home with similar symptoms. As per FHR monitoring, the patient is experiencing uterine contractions. Though patient reported no increase in vaginal discharge or bleeding, on PE there was increased vaginal discharge. No signed of bleeding and cerclage sutures were intake and slightly lacks.  Swabs were collected for GBS, chlamydia and gonorrheae PCR, and wet preparation. The patient denied polyuria and dysuria, however a UA was ordered considered the increased vaginal discharge. Results are pending. Labs also include a CBC and CMP as patient appears fairly dehydrated and were notable  for Hgb of 11, mild hyponatremia at 134, Bicarb of 20, Ca of 9. She was started on anti-emetics and fluid LR bolus. We will re-evaluated after improvement in hydration status and PO challenge.   Plans:  -Anti-emetics and LR Fluids  -Following labs    CBC, CMP, wet mount, UA, STI, and GBS        Written by Ratna Aguirre, Medical Student.

## 2025-06-03 NOTE — PROVIDER NOTIFICATION
06/03/25 1740   Provider Notification   Provider Name/Title Dr. Live   Method of Notification At Bedside   Request Evaluate in Person   Notification Reason Status Update     RN updating MD Live in department of pt reporting contractions feel about the same. Pt denies worsening pain/intensity. Reports pain as low abdomen/back cramping. MD to bedside to discuss with patient. Second IV fluid bolus now complete. MD recommending to give more time to see if improves. Pt and SO understanding and agreeable.

## 2025-06-03 NOTE — PROVIDER NOTIFICATION
06/03/25 1445   Provider Notification   Provider Name/Title Dr. Live   Method of Notification In Department   Request Evaluate - Remote   Notification Reason Status Update   Comments   Nursing Comments Dr. Live requesting for pt to ry PO intake. RN updating MD on pt reports of pain and nausea improved but not resolved. Given PRN zofran. pt continues to have contractions on toco, palpate mild.

## 2025-06-04 VITALS — DIASTOLIC BLOOD PRESSURE: 57 MMHG | RESPIRATION RATE: 16 BRPM | SYSTOLIC BLOOD PRESSURE: 97 MMHG | TEMPERATURE: 98.3 F

## 2025-06-04 LAB
BACTERIA UR CULT: NORMAL
C TRACH DNA SPEC QL PROBE+SIG AMP: NEGATIVE
GP B STREP DNA SPEC QL NAA+PROBE: NEGATIVE
N GONORRHOEA DNA SPEC QL NAA+PROBE: NEGATIVE
SPECIMEN TYPE: NORMAL

## 2025-06-04 PROCEDURE — 250N000013 HC RX MED GY IP 250 OP 250 PS 637

## 2025-06-04 PROCEDURE — 99239 HOSP IP/OBS DSCHRG MGMT >30: CPT | Mod: GC | Performed by: STUDENT IN AN ORGANIZED HEALTH CARE EDUCATION/TRAINING PROGRAM

## 2025-06-04 RX ORDER — METOCLOPRAMIDE 10 MG/1
10 TABLET ORAL EVERY 6 HOURS PRN
Qty: 20 TABLET | Refills: 0 | Status: SHIPPED | OUTPATIENT
Start: 2025-06-04

## 2025-06-04 RX ORDER — NITROFURANTOIN 25; 75 MG/1; MG/1
100 CAPSULE ORAL EVERY 12 HOURS
Qty: 12 CAPSULE | Refills: 0 | Status: SHIPPED | OUTPATIENT
Start: 2025-06-04 | End: 2025-06-10

## 2025-06-04 RX ADMIN — NITROFURANTOIN (MONOHYDRATE/MACROCRYSTALS) 100 MG: 75; 25 CAPSULE ORAL at 08:12

## 2025-06-04 RX ADMIN — PRENATAL VIT W/ FE FUMARATE-FA TAB 27-0.8 MG 1 TABLET: 27-0.8 TAB at 08:12

## 2025-06-04 ASSESSMENT — ACTIVITIES OF DAILY LIVING (ADL)
ADLS_ACUITY_SCORE: 15

## 2025-06-04 NOTE — DISCHARGE SUMMARY
[facility] Discharge Summary    Camelia Santillan MRN# [id number]   Age: 30 year old YOB: 1995     Date of Admission:  6/3/2025  Date of Discharge:  25  Admitting Physician:  Adrianne Chun MD  Discharge Physician:  Ratna Bunch MD      Admission Diagnosis:  - Leslie cerclage (3/14/25)   - H/o PTD x3  - H/o CS w/ T'd incision  - GI illness    Discharge Diagnosis:  - Same as above  -  Recovered GI illness    Procedures:    - Extended tocometry     Consultations:    None     Medications prior to admission:  Medications Prior to Admission   Medication Sig Dispense Refill Last Dose/Taking    Multiple Vitamin (MULTIVITAMIN ADULT PO) Take 1 tablet by mouth.   6/3/2025 Morning     Brief History of Presentation:    Camelia Santillan is a 30 year old  at 32w1d by 9w4d US who initially presented for nausea/vomiting/diarrhea that has subsequently improved while in triage following fluid bolus (see note by Dr Mejia for full HPI surrounding n/v/d history). However, while undergoing monitoring patient began experiencing contractions Since this started she has denied associated bleeding/spotting or leakage of fluid. Does not feel like it has lessened since receiving fluid bolus.     Antepartum Course:    Following admission she remained hemodynamically stable and afebrile. Her contractions continued to space out and by time of discharge she was feeling minimal abdominal discomfort - more consistent with cramping. She experienced no vaginal bleeding/spotting or leakage of fluids. Continuous tocometer revealed continued intermittent contractions that likewise spaced overnight. SVE repeated on day of discharge and closed, cerclage not on tension. UA with concern for UTI, started on abx Macrobid course while urine culture pending. Due to reassuring monitoring, stable exam, improvement in symptoms, and overall low concern for  labor,  she was deemed appropriate for discharge on HD#2. Strict return precautions ewre reviewed.    Discharge Medications:     Review of your medicines        START taking        Dose / Directions   metoclopramide 10 MG tablet  Commonly known as: REGLAN  Used for: Acute cystitis without hematuria      Dose: 10 mg  Take 1 tablet (10 mg) by mouth every 6 hours as needed for nausea or vomiting.  Quantity: 20 tablet  Refills: 0     nitroFURantoin macrocrystal-monohydrate 100 MG capsule  Commonly known as: MACROBID  Indication: Urinary Tract Infection  Used for: Acute cystitis without hematuria      Dose: 100 mg  Take 1 capsule (100 mg) by mouth every 12 hours for 12 doses.  Quantity: 12 capsule  Refills: 0            CONTINUE these medicines which have NOT CHANGED        Dose / Directions   MULTIVITAMIN ADULT PO      Dose: 1 tablet  Take 1 tablet by mouth.  Refills: 0               Where to get your medicines        These medications were sent to [facility] [address]  [address] [address]      Phone: [phone]   metoclopramide 10 MG tablet  nitroFURantoin macrocrystal-monohydrate 100 MG capsule         Discharge Instructions:  Call or present to labor and delivery if you experience:   -Regular painful contractions concerning for labor   -Leakage of fluid concerning for ruptured membranes   -Decreased fetal movement   -Bright red vaginal bleeding    -Headache, vision changes, upper abdominal pain, significant increase in swelling,   generalized unwell feeling    Follow up scheduled with Dr Live on 6/11/2025    Ricardo Valles MD   Obstetrics & Gynecology, PGY-2  06/04/2025 7:56 AM     Physician Attestation   I saw and evaluated this patient prior to discharge.  I discussed the patient with the resident/fellow and agree with plan of care as documented in the note.      I personally reviewed vital signs, medications, labs, and imaging.    I personally spent  35 minutes on discharge activities.    Ratna Bunch MD  Date of Service (when I saw the patient): 6/4/25

## 2025-06-04 NOTE — PLAN OF CARE
PA initiated on CMM   ([facility]    Awaiting determination Pt rested comfortably between care overnight. VSS. Pt denies pre-E symptoms, LOF, bleeding. Pt reports nausea has improved and states no episodes of emesis or diarrhea. Pt was able to eat dinner at 2030. Pt reports noticing contractions, but they are not painful, stronger, or more frequent. Uterine and fetal monitoring as documented. Continue with current plan of care. Support person Sterling at bedside. Pt aware to call RN with any questions or concerns. Call light within reach.

## 2025-06-04 NOTE — DISCHARGE SUMMARY
Patient assessed in the Birthplace. Presumed adequate fetal oxygenation documented. Discharge instructions reviewed. Patient instructed to report change in fetal movement, vaginal leaking of fluid or bleeding, abdominal pain, or any concerns related to the pregnancy to provider/clinic.  Orders to discharge home per Dr. Bunch and Dr. Melvin Marrero. Patient verbalized understanding of education and agreement with plan. Discharged to home at 1024.

## 2025-06-04 NOTE — PROGRESS NOTES
Data: Camelia Santillan transferred to [facility] via wheelchair at 1954.   Action: Receiving unit notified of transfer: Yes. Patient and family notified of room change. Report given to Courtney Carlson at 1943. Belongings sent to receiving unit. Accompanied by Registered Nurse. Oriented patient to surroundings. Call light within reach. ID bands double-checked with receiving RN.  Response: Patient tolerated transfer and is stable.

## 2025-06-04 NOTE — PLAN OF CARE
Pt VSS throughout time in triage, afebrile. Pt nausea/vomiting/diarrhea has resolved. Given 2 L LR total. Pt continues to have mild contractions, which she states feel about the same/a little less. She states more as cramping. Dr. Valles now at bedside, recommending pt to stay for observation, and for digital exam. Pt and  agreeable to stay following discussion and having MD answer their questions. Report given to KLEVER Terry who assumes care at this time.

## 2025-06-04 NOTE — PROGRESS NOTES
[facility]  Antepartum Progress Note    Subjective   Patient doing well today. Contractions are irregular, occur every 20-30 minutes, at worst they are a 3/10. She feels they are like period cramps. No vaginal bleeding, or loss of fluid. No ongoing nausea, vomiting, diarrhea. Endorses normal fetal movement. No other concerns. If she is not making cervical change she would like to be discharged and rest at home.    Objective     Vitals:    06/03/25 2003 06/04/25 0033 06/04/25 0627 06/04/25 0810   BP: 102/57 98/56 100/57 97/57   BP Location: Left arm Left arm Right arm Right arm   Patient Position: Semi-Del Cid's Semi-Del Cid's Semi-Del Cid's Semi-Del Cid's   Cuff Size: Adult Regular Adult Regular Adult Regular Adult Regular   Resp: 15 18 16 16   Temp: 98.3  F (36.8  C) 97.6  F (36.4  C) 98.1  F (36.7  C) 98.3  F (36.8  C)   TempSrc: Oral Oral Oral Oral     Gen:  Resting comfortably, NAD  CV:  Regular rate and rhythm, well perfused  Pulm:  Non-labored breathing on room air  Abd:  Gravid, non-tender, non-distended   Ext:  Non-tender, trace edema to bilateral lower extremities      Weight:   Wt Readings from Last 2 Encounters:   03/14/25 52.2 kg (115 lb 1.6 oz)       SVE: c/l/h, cerclage palpated and not on tension  FHT: Baseline 135 bpm, moderate variability, accelerations present, decelerations absent  [place]:1 contraction in 20 minutes  Impression: reactive, appropriate for gestational age    Prenatal Labs:  Rh positive, antibody negative  Rubella NI  Hepatitis B sAg NR, Hepatitis C NR, HIV NR, RPR negative  GBS negative    Labs:   Latest Reference Range & Units 06/03/25 13:38   Sodium 135 - 145 mmol/L 134 (L)   Potassium 3.4 - 5.3 mmol/L 3.5   Chloride 98 - 107 mmol/L 103   Carbon Dioxide (CO2) 22 - 29 mmol/L 20 (L)   Urea Nitrogen 6.0 - 20.0 mg/dL 6.2   Creatinine 0.51 - 0.95 mg/dL 0.39 (L)   GFR Estimate >60 mL/min/1.73m2 >90   Calcium 8.8 - 10.4 mg/dL 8.0 (L)   Anion Gap 7  - 15 mmol/L 11   Albumin 3.5 - 5.2 g/dL 3.0 (L)   Protein Total 6.4 - 8.3 g/dL 5.9 (L)   Alkaline Phosphatase 40 - 150 U/L 136   ALT 0 - 50 U/L 14   AST 0 - 45 U/L 16   Bilirubin Total <=1.2 mg/dL 0.9   Glucose 70 - 99 mg/dL 82   WBC 4.0 - 11.0 10e3/uL 8.4   Hemoglobin 11.7 - 15.7 g/dL 11.0 (L)   Hematocrit 35.0 - 47.0 % 32.7 (L)   Platelet Count 150 - 450 10e3/uL 204   RBC Count 3.80 - 5.20 10e6/uL 3.39 (L)   MCV 78 - 100 fL 97   MCH 26.5 - 33.0 pg 32.4   MCHC 31.5 - 36.5 g/dL 33.6   RDW 10.0 - 15.0 % 12.4   (L): Data is abnormally low     Latest Reference Range & Units 25 21:03   WBC 4.0 - 11.0 10e3/uL 6.3   Hemoglobin 11.7 - 15.7 g/dL 10.9 (L)   Hematocrit 35.0 - 47.0 % 32.3 (L)   Platelet Count 150 - 450 10e3/uL 211   RBC Count 3.80 - 5.20 10e6/uL 3.36 (L)   MCV 78 - 100 fL 96   MCH 26.5 - 33.0 pg 32.4   MCHC 31.5 - 36.5 g/dL 33.7   RDW 10.0 - 15.0 % 12.5   (L): Data is abnormally low     Latest Reference Range & Units 25 13:55   Color Urine Colorless, Straw, Light Yellow, Yellow  Yellow   Appearance Urine Clear  Slightly Cloudy !   Glucose Urine Negative mg/dL Negative   Bilirubin Urine Negative  Negative   Ketones Urine Negative mg/dL 40 !   Specific Gravity Urine 1.003 - 1.035  1.019   pH Urine 5.0 - 7.0  6.5   Protein Albumin Urine Negative mg/dL Negative   Urobilinogen mg/dL Normal mg/dL Normal   Nitrite Urine Negative  Negative   Blood Urine Negative  Negative   Leukocyte Esterase Urine Negative  Moderate !   WBC Urine <=5 /HPF 19 (H)   RBC Urine <=2 /HPF 2   Bacteria Urine None Seen /HPF Moderate !   Squamous Epithelial /HPF Urine <=1 /HPF 23 (H)   Transitional Epi <=1 /HPF <1   Mucus Urine None Seen /LPF Present !   !: Data is abnormal  (H): Data is abnormally high    Wet prep wnl    Imagin/8/25  1. Gibbons pregnancy at 28w 3d gestational age.  2. None of the anomalies commonly detected by ultrasound were evident in the limited fetal anatomic survey as described above.  3. Growth  parameters and estimated fetal weight were appropriate for gestational age. Adequate interval fetal growth.  4. The amniotic fluid volume appeared normal.  5. Previously seen synechiae is not visualized on today's scan       Assessment/Plan   30 year old  at 32w2d admitted for observation for contractions in the setting of cerclage in place and history of  birth. Cramping is stable overnight in the setting of recent gastroenteric disease. Given unchanged, closed cervix, lower suspicion for evolving  labor. Discussed return precautions including worsening contractions, vaginal bleeding and DFM. Given cerclage not on tension, stable cervical exam, and improvement in GI symptoms, cerclage removal not felt to be mandatory at this time.    Pregnancy has been complicated by:  - Leslie cerclage in place (3/14/25)   - H/o PTD x3  - H/o CS w/ T'd incision  - h/o placenta previa, hemorrhage and termination at 20 weeks    # Rule out  labor  - Unchanged cervical exam with cerclage not on tension  - Mild intermittent cramping is stable and likely secondary to dehydration from GI illness  - Follow up OB appointment on      # UTI concern on UA with leukocyte esterase, WBC, bacteria  - D#2/7 macrobid BID  - urine culture pending  - will phone patient to discontinue pending urine culture results    # Fetal well being  - Reactive and reassuring NST today  - BMTZ deferred given low likelihood for anticipated delivery     # Inpatient Management  - Up ad chelsey  - Regular diet  - Q72h CBC, type & screen    # Delivery Plan   - Mode of delivery: repeat   - Timing of delivery: 36 weeks due to history of T'd incision    Medically Ready for Discharge: Anticipated Today    Patient seen and discussed with Dr. Julio C Marrero MD  Obstetrics and Gynecology, PGY2  2025, 11:27 AM    Clinically Significant Risk Factors Present on Admission         # Hyponatremia: Lowest Na = 134 mmol/L  in last 2 days, will monitor as appropriate       # Hypoalbuminemia: Lowest albumin = 3 g/dL at 6/3/2025  1:38 PM, will monitor as appropriate                              Physician Attestation   I saw this patient with the resident and agree with the resident/fellow's findings and plan of care as documented in the note.  I personally edited the above note to reflect the plan of care.    Key findings: 31 yo  at 32w2d admitted for observation after developing irregular contractions / uterine cramping in the setting of cerclage and GI illness. GI symptoms improved. Multiple SVE with closed cervix and cerclage not on tension. Given unchanged, closed cervix after overnight observation, lower suspicion for evolving  labor. Discussed return precautions including worsening contractions, vaginal bleeding and DFM. Given cerclage not on tension, stable cervical exam, and improvement in GI symptoms, cerclage removal and BMTZ not felt to be mandatory at this time. NST reactive and reassuring, appropriate for gestational age.    BP 97/57 (BP Location: Right arm, Patient Position: Semi-Del Cid's, Cuff Size: Adult Regular)   Temp 98.3  F (36.8  C) (Oral)   Resp 16   LMP 10/20/2024     Future Appointments   Date Time Provider Department Center   2025  3:15 PM Lenoir, Lyndon Freeman MD [facility]       Please see A&P for additional details of medical decision making.  35 MINUTES SPENT BY ME on the date of service doing chart review, history, exam, documentation & further activities per the note.    Ratna Bunch MD  Date of Service (when I saw the patient): 25

## 2025-06-07 ENCOUNTER — PATIENT OUTREACH (OUTPATIENT)
Dept: CARE COORDINATION | Facility: CLINIC | Age: 30
End: 2025-06-07
Payer: COMMERCIAL

## 2025-06-09 NOTE — PROGRESS NOTES
[facility] Resource Center:   [facility] Contact  [facility]/Voicemail     Clinical Data: Post-Discharge Outreach     Outreach attempted x 5.  Unable to leave voicemail. Phone was answered on 5th attempt, when asking for patient call was disconnected.  Message was left with 1st attempt.      Plan:  [facility] will do no further outreaches at this time.       Adrianne Ramirez, KLEVER  [facility], [facility]    *Connected Care Resource Team does NOT follow patient ongoing. Referrals are identified based on internal discharge reports and the outreach is to ensure patient has an understanding of their discharge instructions.

## 2025-06-19 NOTE — PROGRESS NOTES
"[facility]  Return OB Visit/CS consult    Visit completed with assistance of Lao iPad .     S:   Camelia is a 30 year old  with no chronic disease presenting with for follow routine OB visit and CS consultation. Her pregnancy has been complicated by short cervix and per-term dilation managed with cerclage placement on 3/14/25.      Patient presented to triage on 6/3/2025 due to n/v and diarrhea which improved with a fluid bolus, however she later developed contractions on tocometer warranted continued FH monitoring and tocometer. Overnight contractions spaced out and labs came back concerning for a UTI that was treated with a course of nitrofurantion and she was discharge home on hospital day 2.    Today the patient is doing well with no nausea, vomiting, or diarrhea at rest or while eating and drink. She reports no vaginal bleeding and no changes in vaginal discharge. She feels baby movement everyday, particularly at tonight and experiences contractions that are ~ 1 minute long every 20 minutes of so for the past several days. No concerns for rupture of membranes. She endorses some RUQ pain that is sharp and stabbing in nature that is intermittent and started ~ 1 month ago with fetal movements. No itching of the hands or feet.  ROS is otherwise negative for fever, dizziness, changes in vision, headache, chest pain, heart palpitations, SOB, cough, rhinorrhea, blood in the urine, blood in the stool, joint swelling, or rashes.     Pregnancy notable for  - Hx CS x1 w/ T incision into active segment  - Hx  birth x3  - History and US indicated cerclage  - Rubella non-immune  - Varicella non-immune    O:  LMP 10/20/2024   BP 99/63   Pulse 88   Ht 1.49 m (4' 10.66\")   Wt 57.4 kg (126 lb 9.6 oz)   LMP 10/20/2024   BMI 25.87 kg/m     Weight gain: Not found.  Gen: Well-appearing, NAD  Cervix: Cervical exam not performed today  Fundal Height:  33cm  FHR: 135 bpm    Labs:  CBC RESULTS: "   Recent Labs   Lab Test 06/03/25 2103   WBC 6.3   RBC 3.36*   HGB 10.9*   HCT 32.3*   MCV 96   MCH 32.4   MCHC 33.7   RDW 12.5         Last Comprehensive Metabolic Panel:  Lab Results   Component Value Date     (L) 06/03/2025    POTASSIUM 3.5 06/03/2025    CHLORIDE 103 06/03/2025    CO2 20 (L) 06/03/2025    ANIONGAP 11 06/03/2025    GLC 82 06/03/2025    BUN 6.2 06/03/2025    CR 0.39 (L) 06/03/2025    GFRESTIMATED >90 06/03/2025    JC 8.0 (L) 06/03/2025     UA RESULTS:  Recent Labs   Lab Test 06/03/25  1355   COLOR Yellow   APPEARANCE Slightly Cloudy*   URINEGLC Negative   URINEBILI Negative   URINEKETONE 40*   SG 1.019   UBLD Negative   URINEPH 6.5   PROTEIN Negative   NITRITE Negative   LEUKEST Moderate*   RBCU 2   WBCU 19*      Meds:  Current Outpatient Medications   Medication Sig Dispense Refill    metoclopramide (REGLAN) 10 MG tablet Take 1 tablet (10 mg) by mouth every 6 hours as needed for nausea or vomiting. 20 tablet 0    Multiple Vitamin (MULTIVITAMIN ADULT PO) Take 1 tablet by mouth.       No current facility-administered medications for this visit.     Imaging:  [facility] comprehensive  - 5/08/2025  General: Cardiac activity present.  bpm. Fetal movements: present. Presentation: breech Placenta: Anterior, No Previa, > 2 cm from internal os Umbilical cord: 3 vessel cord. Amniotic fluid: Amount of AF: normal. MVP 5.3 cm  EFW of 1,195g,  30%  The following structures appear normal:  Head / Neck                         Cranium. Head size. Head shape. Lateral ventricles. Midline falx. Cavum septi pellucidi. Cerebellum. Cisterna magna. Thalami.  Face                                   Lips. Profile. Nose.  Heart / Thorax                      4-chamber view. RVOT view. LVOT view. 3-vessel-trachea view.                                             Diaphragm.  Abdomen                             Stomach. Kidneys. Bladder.  Spine                                  Cervical spine. Thoracic spine.      The following structures were documented previously:  Spine                                  Lumbar spine. Sacral spine.     Fetal sex: female.  Recommendation: I discussed the findings on today's ultrasound with the patient using a [place] video  (Shakeel #[id number]). I reviewed the limitations of ultrasound. Further ultrasound studies as clinically indicated. Return to primary provider for continued prenatal care.    Please see full reports for additional details    A/P:  Camelia is a 30 year old  for routine OB visit and CS consultation. Her pregnancy has been complicated by short cervix managed with cerclage placement on 3/14/25.      Prenatal care  Hx CS w/ T'd uterine incision into active segment  Leslie cerclage, 1 suture at 12 o'clock  - Labs: Rh pos, suzette neg, rubella NON-immune, infectious labs negative   - Screening A1C 4.8   - 3rd tri labs: GCT nl, Hgb 12.2, trep neg   - GBS collected today  - Vaccines: S/p flu vaccine, COVID, TDAP  - Genetics: NIPT low risk; declined other screening  - US:   - Level 2 US (3/21) fundal right synechiae, otherwise normal  - Growth US (/) synechiae resolved; EFW 1195g / 30%, AC 51%  - Delivery planning: recommend RCS at [id number] d/t history of T'd uterine incision into active segment. Orders placed today. Gave soap and ensure, CS prep packet. Reviewed rationale for repeat CS. Scheduled for 25.       Return to clinic in 1 weeks. Discussed return precautions.    Written by Ratna Aguirre, Medical Student    I appreciate the note above by medical student, Ratna Aguirre.  I was present with the medical student who participated in the service and in the documentation of the note. I have verified the history and personally performed the physical exam and medical decision making. I agree with the assessment and plan of care as documented in the note.    Lyndon Mejia MD    [facility]  Obstetrics, Gynecology, and  Women's Health  8:06 AM 06/23/2025

## 2025-06-20 ENCOUNTER — PRENATAL OFFICE VISIT (OUTPATIENT)
Dept: OBGYN | Facility: CLINIC | Age: 30
End: 2025-06-20
Attending: STUDENT IN AN ORGANIZED HEALTH CARE EDUCATION/TRAINING PROGRAM
Payer: COMMERCIAL

## 2025-06-20 VITALS
BODY MASS INDEX: 25.52 KG/M2 | HEIGHT: 59 IN | WEIGHT: 126.6 LBS | HEART RATE: 88 BPM | DIASTOLIC BLOOD PRESSURE: 63 MMHG | SYSTOLIC BLOOD PRESSURE: 99 MMHG

## 2025-06-20 DIAGNOSIS — Z33.1 PREGNANT STATE, INCIDENTAL: Primary | ICD-10-CM

## 2025-06-20 PROBLEM — O34.32 CERVICAL CERCLAGE SUTURE PRESENT IN SECOND TRIMESTER: Status: ACTIVE | Noted: 2025-03-26

## 2025-06-20 PROCEDURE — G0463 HOSPITAL OUTPT CLINIC VISIT: HCPCS | Performed by: STUDENT IN AN ORGANIZED HEALTH CARE EDUCATION/TRAINING PROGRAM

## 2025-06-20 PROCEDURE — 87653 STREP B DNA AMP PROBE: CPT | Performed by: STUDENT IN AN ORGANIZED HEALTH CARE EDUCATION/TRAINING PROGRAM

## 2025-06-20 NOTE — NURSING NOTE
Chief Complaint   Patient presents with    Consult      consult    Prenatal Care     34w4d        used:     ID# [id number] Fernando

## 2025-06-21 LAB — GP B STREP DNA SPEC QL NAA+PROBE: NEGATIVE

## 2025-06-27 ENCOUNTER — HOSPITAL ENCOUNTER (OUTPATIENT)
Facility: CLINIC | Age: 30
Discharge: HOME | End: 2025-06-27
Attending: STUDENT IN AN ORGANIZED HEALTH CARE EDUCATION/TRAINING PROGRAM | Admitting: STUDENT IN AN ORGANIZED HEALTH CARE EDUCATION/TRAINING PROGRAM
Payer: COMMERCIAL

## 2025-06-27 VITALS — DIASTOLIC BLOOD PRESSURE: 63 MMHG | RESPIRATION RATE: 18 BRPM | SYSTOLIC BLOOD PRESSURE: 99 MMHG | TEMPERATURE: 98.3 F

## 2025-06-27 DIAGNOSIS — B96.89 BACTERIAL VAGINOSIS IN PREGNANCY: Primary | ICD-10-CM

## 2025-06-27 DIAGNOSIS — O23.599 BACTERIAL VAGINOSIS IN PREGNANCY: Primary | ICD-10-CM

## 2025-06-27 PROBLEM — Z36.89 ENCOUNTER FOR TRIAGE IN PREGNANT PATIENT: Status: ACTIVE | Noted: 2025-06-27

## 2025-06-27 LAB
CLUE CELLS: PRESENT
CRYSTALS AMN MICRO: NORMAL
RUPTURE OF FETAL MEMBRANES BY ROM PLUS: NEGATIVE
TRICHOMONAS, WET PREP: ABNORMAL
WBC'S/HIGH POWER FIELD, WET PREP: ABNORMAL
YEAST, WET PREP: ABNORMAL

## 2025-06-27 PROCEDURE — 84112 EVAL AMNIOTIC FLUID PROTEIN: CPT

## 2025-06-27 PROCEDURE — 87491 CHLMYD TRACH DNA AMP PROBE: CPT

## 2025-06-27 PROCEDURE — G0463 HOSPITAL OUTPT CLINIC VISIT: HCPCS

## 2025-06-27 PROCEDURE — 87210 SMEAR WET MOUNT SALINE/INK: CPT

## 2025-06-27 RX ORDER — METRONIDAZOLE 500 MG/1
500 TABLET ORAL 2 TIMES DAILY
Qty: 14 TABLET | Refills: 0 | Status: ON HOLD | OUTPATIENT
Start: 2025-06-27 | End: 2025-07-03

## 2025-06-27 RX ORDER — LIDOCAINE 40 MG/G
CREAM TOPICAL
Status: DISCONTINUED | OUTPATIENT
Start: 2025-06-27 | End: 2025-06-27 | Stop reason: HOSPADM

## 2025-06-27 ASSESSMENT — ACTIVITIES OF DAILY LIVING (ADL)
ADLS_ACUITY_SCORE: 15
ADLS_ACUITY_SCORE: 41
ADLS_ACUITY_SCORE: 15

## 2025-06-27 NOTE — DISCHARGE INSTRUCTIONS
Sepa cuándo llamar al médico jeimy el embarazo (después de 20 semanas)  Learning About When to Call Your Doctor During Pregnancy (After 20 Weeks)  Generalidades  Es común tener inquietudes sobre lo que pudiera ser un problema jeimy el embarazo. La mayoría de los embarazos no tienen ningún problema grave. [place] todavía es importante saber cuándo llamar al médico si tiene ciertos síntomas o señales de trabajo de parto.  Estas son sugerencias generales. El médico puede darle un poco más de información sobre cuándo pedir ayuda.  Cuándo llamar al médico (después de 20 semanas)  Llame al 911 en cualquier momento que crea que pueda necesitar atención de emergencia. Por ejemplo, llame si:  Tiene sangrado vaginal intenso. Ha empapado eva o más toallas sanitarias en eva hora y la hemorragia no disminuye.  Tiene un dolor repentino e intenso en el abdomen que no desaparece.  Tiene dolor en el pecho, le falta el aire o tose georgette.  Se desmayó (perdió el conocimiento).  Tiene eva convulsión.  Ve o siente el cordón umbilical.  Nisha que está a punto de seth a chan y no puede llegar de forma snell al hospital o al centro de partos.  Llame al médico ahora u obtenga atención médica inmediata si:  Tiene sangrado vaginal.  Tiene dolor de abdomen.  Tiene fiebre.  Está mareada o aturdida, o siente que puede desmayarse.  Tiene señales de un coágulo de georgette en la pierna (llamado trombosis venosa profunda), anselmo:  Dolor en la pantorrilla, la parte posterior de la rodilla, el muslo o la dave.  Hinchazón en la pierna o la dave.  Un cambio de color en la pierna o la dave. La piel puede estar rojiza o violácea.  Tiene síntomas de preeclampsia, anselmo:  Hinchazón repentina de la sandra, las julio o los pies.  Nuevos problemas de visión (anselmo oscurecimiento, visión borrosa o visión de manchas).  Un minesh dolor de cherry que no desaparece.  Tiene eva salida repentina o un goteo lento de líquido por la vagina. [place] puede significar que rivera  roto kasia.  Ha tenido contracciones regulares jeimy vea hora cuando aún no ha cumplido 37 semanas. [place] significa que ha tenido al menos 6 contracciones en 1 hora, incluso después de cambiar de postura y beber líquidos.  Nota que osorio bebé ha dejado de moverse o se mueve menos de lo normal.  Tiene señales de insuficiencia cardíaca, anselmo:  Falta de aliento nueva o mayor.  Hinchazón nueva o peor en las piernas, los tobillos o los pies.  Aumento repentino de peso, anselmo más de 2 o 3 libras (1 o 1.5 kg) en un día o 5 libras (2 kg) en eva semana.  Sentirse tan cansada o débil que no pueda realizar galileo actividades habituales.  Tiene síntomas de eva infección de las vías urinarias. Estos pueden incluir:  Dolor o ardor al orinar.  Necesidad frecuente de orinar sin poder expulsar mucha orina.  Dolor en la sanam lumbar (por debajo de la caja torácica y por encima de la cintura).  Yuval en la orina.  Esté atenta a cualquier cambio en osorio estado de franko y póngase en contacto con el médico si:  Tiene flujo vaginal que huele mal.  Se siente maria elena, ansiosa o desesperanzada jeimy más de unos salima.  Tiene cambios en la piel, anselmo salpullidos, comezón o un color amarillento en la piel.  Tiene otras preocupaciones sobre el embarazo.  Si tiene señales de trabajo de parto a las 37 semanas o más  Si tiene señales de trabajo de parto a las 37 semanas o más, es posible que el médico le diga que llame cuando el trabajo de parto sea más activo. Jeimy el trabajo de parto activo:  Las contracciones se producen con más frecuencia y a intervalos regulares (aproximadamente cada 3 a 5 minutos).  Las contracciones ventura más (unos 60 segundos o más).  Las contracciones se intensifican y es difícil hablar mientras se producen.  La atención de seguimiento es eva parte clave de osorio tratamiento y seguridad. Asegúrese de hacer y acudir a todas las citas y llame al médico si está teniendo problemas. También es eva buena idea saber los resultados de  "galileo pruebas y mantener eva lista de los medicamentos que juancarlos.   Dónde puede encontrar más información en inglés?  Vaya a [email or web address]  Escriba N531 en la búsqueda para aprender más acerca de \"Sepa cuándo llamar al médico jeimy el embarazo (después de 20 semanas).\"  Revisado: 30 abril, 2024  Versión del contenido: 14.5    [id number] [place].   Las instrucciones de cuidado fueron adaptadas bajo licencia por osorio profesional de atención médica. Si usted tiene preguntas sobre eva afección médica o sobre estas instrucciones, siempre pregunte a osorio profesional de franko. [place] niega toda garantía o responsabilidad por osorio uso de esta información.    "

## 2025-06-27 NOTE — PLAN OF CARE
Pt arrived from clinic for rule out rupture of membranes and evaluation of abdominal cramping. Ratna reports (using iPad ) that she noted small amount of watery discharge yesterday and has had lower abdominal cramping about every 20 minutes since. VSS. [place]/EFM applied. MD DANTE Mejia and MD AP Hu notified of pt arrival and status.

## 2025-06-27 NOTE — PLAN OF CARE
Data: Patient assessed in the [facility] for leaking vaginal fluid and abdominal pain. Cervix   0 cm dilated. Membranes intact per ROM+ negative, negative ferning, and negative pooling. Contractions are present. Contactions are , 6-8 minutes apart, and last  seconds. Uterine assessment is   mild during contractions and soft by palpation at rest. See flowsheets for fetal assessment documentation.     Action: Presumed adequate fetal oxygenation documented. Discharge instructions reviewed. Patient instructed to report change in fetal movement, vaginal leaking of fluid or bleeding, abdominal pain, or any concerns related to the pregnancy to provider/clinic.      Response: Orders to discharge home per MD FIDEL Live/AP Hu. Patient verbalized understanding of education and agreement with plan,  used on iPad. Discharged to home with oral Flagyl at 1640.

## 2025-06-27 NOTE — PROGRESS NOTES
[facility]  OB Triage Note    CC: Leakage of fluid    Interview done with ipad     HPI: Camelia Santillan is a 30 year old  at 35w4d by 9w4d US, who presents with leakage of fluid. She noticed a small amount of fluid in her underwear yesterday with nothing since. She is uncertain of the color of the fluid because it was so small. She feels contractions that come and go. When present they are every ~20 minutes. Denies vaginal bleeding.  + Good fetal movement. Last had intercourse last week.    Obstetric Complications  - Hx CS x1 w/ T incision into active segment  - Hx  birth x3  - History and US indicated cerclage  - Rubella non-immune  - Varicella non-immune    O:  Patient Vitals for the past 24 hrs:   BP Temp Temp src Resp   25 1407 99/63 98.3  F (36.8  C) Oral 18     Gen: Well-appearing, NAD  CV: Regular rate, well perfused  Pulm: Normal work of breathing on room air  Abd: Soft, gravid  Ext: trace LE edema b/l    Spec: No pooling, no blood, knot seen at 12 o'clock although difficult to visualize cerclage around cervix due to pale color of suture, no overt signs of tension  Cervix: Closed, does not feel to be labored around the cerclage    FHT: , mod ness, + accels, - decels  [place]: 1 ctx in 10-20 mins    Labs:  Recent Results (from the past 24 hours)   Rupture of Fetal Membranes by ROM Plus    Collection Time: 25  2:39 PM   Result Value Ref Range    Rupture of Fetal Membranes by ROM Plus Negative Negative, Invalid, Suggest Repeat   Wet prep    Collection Time: 25  2:39 PM    Specimen: Vagina; Swab   Result Value Ref Range    Trichomonas Absent Absent    Yeast Absent Absent    Clue Cells Present (A) Absent    WBCs/high power field 4+ (A) None   Fern Test for Rupture of Membranes    Collection Time: 25  2:39 PM   Result Value Ref Range    Fern Crystallization No ferning present No ferning present       A/P:  Camelia Ross  Tyrell is a 30 year old  at 35w4d by 9w4d US here with leakage of fluid. Her report is reassuring given minimal leakage with no ongoing fluid and no gush. However, given her history of classical with cerclage in place, rupture and labor must be assessed. She is feeling intermittent contractions which are seen on tocometry but cervix does not appear to be dilating against cerclage. Her ROM+ and ferning were negative with clue cells on wet prep. Overall, low suspicion for labor and/or rupture.   - 500 flagyl BID x7days ordered    #FWB: - Category 1 FHT    She was discharged to home with return precuation counseling from Dr Mayte Live MD  Obstetrics & Gynecology, PGY-3  2025 3:30 PM     I personally examined and evaluated Camelia Santillan on 2025 with an iPad .  I discussed the patient with Dr. Live and agree with the presentation, exam and plan of care documented in this note with edits by me.  Feeling intermittent contractions but not stronger or more frequent than normal for her.  Discussed oral versus vaginal treatment for bacterial vaginosis.  She prefers oral treatment and oral metronidazole was sent to the pharmacy.  Discussed if contractions increase in frequency or intensity or feeling severe abdominal pain that she should present to L&D over the weekend, otherwise we will plan for planned  on Tuesday.  NST reactive and reassuring.  Enriqueta Hu MD

## 2025-06-30 ENCOUNTER — ANESTHESIA EVENT (OUTPATIENT)
Dept: OBGYN | Facility: CLINIC | Age: 30
End: 2025-06-30
Payer: COMMERCIAL

## 2025-06-30 NOTE — ANESTHESIA PREPROCEDURE EVALUATION
Anesthesia Pre-Procedure Evaluation    Patient: Camelia Santillan   MRN: [id number] : 1995          Procedure : Procedure(s):   SECTION         No past medical history on file.   Past Surgical History:   Procedure Laterality Date    CERCLAGE CERVICAL N/A 3/14/2025    Procedure: CERCLAGE, CERVIX, VAGINAL APPROACH;  Surgeon: Ngozi Bonner MD;  Location: [facility] L+D     SECTION  2018      Allergies   Allergen Reactions    Penicillins Rash      Social History     Tobacco Use    Smoking status: Never     Passive exposure: Never    Smokeless tobacco: Never   Substance Use Topics    Alcohol use: Not Currently      Wt Readings from Last 1 Encounters:   25 56.7 kg (125 lb)        Anesthesia Evaluation   Pt has had prior anesthetic. Type: Regional and General.    No history of anesthetic complications       ROS/MED HX  ENT/Pulmonary:  - neg pulmonary ROS     Neurologic:  - neg neurologic ROS     Cardiovascular:  - neg cardiovascular ROS     METS/Exercise Tolerance:     Hematologic:     (+)      anemia,          Musculoskeletal:       GI/Hepatic:  - neg GI/hepatic ROS     Renal/Genitourinary:       Endo:  - neg endo ROS     Psychiatric/Substance Use:  - neg psychiatric ROS     Infectious Disease:       Malignancy:       Other:    @ 36w0d   - Short cervix s/p cerclage 3/14/25  - History 3 prior  births and 19 week fetal loss              Physical Exam    OUTSIDE LABS:  CBC:   Lab Results   Component Value Date    WBC 6.3 2025    WBC 8.4 2025    HGB 10.9 (L) 2025    HGB 11.0 (L) 2025    HCT 32.3 (L) 2025    HCT 32.7 (L) 2025     2025     2025     BMP:   Lab Results   Component Value Date     (L) 2025    POTASSIUM 3.5 2025    CHLORIDE 103 2025    CO2 20 (L) 2025    BUN 6.2 2025    CR 0.39 (L) 2025    CR 0.48 (L) 01/10/2025    GLC 82 2025     COAGS: No results  "found for: \"PTT\", \"INR\", \"FIBR\"  POC: No results found for: \"BGM\", \"HCG\", \"HCGS\"  HEPATIC:   Lab Results   Component Value Date    ALBUMIN 3.0 (L) 06/03/2025    PROTTOTAL 5.9 (L) 06/03/2025    ALT 14 06/03/2025    AST 16 06/03/2025    ALKPHOS 136 06/03/2025    BILITOTAL 0.9 06/03/2025     OTHER:   Lab Results   Component Value Date    JC 8.0 (L) 06/03/2025       Anesthesia Plan    ASA Status:  2       Anesthesia Type: Spinal.        Consents    Anesthesia Plan(s) and associated risks, benefits, and realistic alternatives discussed. Questions answered and patient/representative(s) expressed understanding.     - Discussed:     - Discussed with:  Patient               Postoperative Care         Comments:                   Marilyn Becerra DO    I have reviewed the pertinent notes and labs in the chart from the past 30 days and (re)examined the patient.  Any updates or changes from those notes are reflected in this note.    Clinically Significant Risk Factors Present on Admission                                              "

## 2025-07-01 ENCOUNTER — HOSPITAL ENCOUNTER (INPATIENT)
Facility: CLINIC | Age: 30
LOS: 2 days | Discharge: HOME | End: 2025-07-03
Attending: STUDENT IN AN ORGANIZED HEALTH CARE EDUCATION/TRAINING PROGRAM | Admitting: STUDENT IN AN ORGANIZED HEALTH CARE EDUCATION/TRAINING PROGRAM
Payer: COMMERCIAL

## 2025-07-01 ENCOUNTER — ANESTHESIA (OUTPATIENT)
Dept: OBGYN | Facility: CLINIC | Age: 30
End: 2025-07-01
Payer: COMMERCIAL

## 2025-07-01 ENCOUNTER — OFFICE VISIT (OUTPATIENT)
Dept: INTERPRETER SERVICES | Facility: CLINIC | Age: 30
End: 2025-07-01
Payer: COMMERCIAL

## 2025-07-01 DIAGNOSIS — Z98.891 S/P CESAREAN SECTION: ICD-10-CM

## 2025-07-01 DIAGNOSIS — Z98.891 HISTORY OF CLASSICAL CESAREAN SECTION: ICD-10-CM

## 2025-07-01 LAB
ABO + RH BLD: NORMAL
BLD GP AB SCN SERPL QL: NEGATIVE
ERYTHROCYTE [DISTWIDTH] IN BLOOD BY AUTOMATED COUNT: 12.7 % (ref 10–15)
HCT VFR BLD AUTO: 31.8 % (ref 35–47)
HGB BLD-MCNC: 10.8 G/DL (ref 11.7–15.7)
MCH RBC QN AUTO: 31.9 PG (ref 26.5–33)
MCHC RBC AUTO-ENTMCNC: 34 G/DL (ref 31.5–36.5)
MCV RBC AUTO: 94 FL (ref 78–100)
PLATELET # BLD AUTO: 239 10E3/UL (ref 150–450)
RBC # BLD AUTO: 3.39 10E6/UL (ref 3.8–5.2)
SPECIMEN EXP DATE BLD: NORMAL
T PALLIDUM AB SER QL: NONREACTIVE
WBC # BLD AUTO: 6.1 10E3/UL (ref 4–11)

## 2025-07-01 PROCEDURE — 250N000009 HC RX 250: Performed by: STUDENT IN AN ORGANIZED HEALTH CARE EDUCATION/TRAINING PROGRAM

## 2025-07-01 PROCEDURE — 85027 COMPLETE CBC AUTOMATED: CPT | Performed by: STUDENT IN AN ORGANIZED HEALTH CARE EDUCATION/TRAINING PROGRAM

## 2025-07-01 PROCEDURE — 0UCC7ZZ EXTIRPATION OF MATTER FROM CERVIX, VIA NATURAL OR ARTIFICIAL OPENING: ICD-10-PCS | Performed by: STUDENT IN AN ORGANIZED HEALTH CARE EDUCATION/TRAINING PROGRAM

## 2025-07-01 PROCEDURE — 272N000001 HC OR GENERAL SUPPLY STERILE: Performed by: STUDENT IN AN ORGANIZED HEALTH CARE EDUCATION/TRAINING PROGRAM

## 2025-07-01 PROCEDURE — 258N000003 HC RX IP 258 OP 636: Performed by: STUDENT IN AN ORGANIZED HEALTH CARE EDUCATION/TRAINING PROGRAM

## 2025-07-01 PROCEDURE — 258N000003 HC RX IP 258 OP 636

## 2025-07-01 PROCEDURE — 250N000013 HC RX MED GY IP 250 OP 250 PS 637

## 2025-07-01 PROCEDURE — 999N000016 HC STATISTIC ATTENDANCE AT DELIVERY

## 2025-07-01 PROCEDURE — T1013 SIGN LANG/ORAL INTERPRETER: HCPCS | Mod: U3

## 2025-07-01 PROCEDURE — 250N000013 HC RX MED GY IP 250 OP 250 PS 637: Performed by: STUDENT IN AN ORGANIZED HEALTH CARE EDUCATION/TRAINING PROGRAM

## 2025-07-01 PROCEDURE — 86901 BLOOD TYPING SEROLOGIC RH(D): CPT | Performed by: STUDENT IN AN ORGANIZED HEALTH CARE EDUCATION/TRAINING PROGRAM

## 2025-07-01 PROCEDURE — 360N000076 HC SURGERY LEVEL 3, PER MIN: Performed by: STUDENT IN AN ORGANIZED HEALTH CARE EDUCATION/TRAINING PROGRAM

## 2025-07-01 PROCEDURE — 120N000002 HC R&B MED SURG/OB UMMC

## 2025-07-01 PROCEDURE — 250N000011 HC RX IP 250 OP 636

## 2025-07-01 PROCEDURE — 86780 TREPONEMA PALLIDUM: CPT | Performed by: STUDENT IN AN ORGANIZED HEALTH CARE EDUCATION/TRAINING PROGRAM

## 2025-07-01 PROCEDURE — 370N000017 HC ANESTHESIA TECHNICAL FEE, PER MIN: Performed by: STUDENT IN AN ORGANIZED HEALTH CARE EDUCATION/TRAINING PROGRAM

## 2025-07-01 PROCEDURE — 250N000011 HC RX IP 250 OP 636: Performed by: ANESTHESIOLOGY

## 2025-07-01 PROCEDURE — 999N000141 HC STATISTIC PRE-PROCEDURE NURSING ASSESSMENT: Performed by: STUDENT IN AN ORGANIZED HEALTH CARE EDUCATION/TRAINING PROGRAM

## 2025-07-01 PROCEDURE — 59514 CESAREAN DELIVERY ONLY: CPT | Mod: GC | Performed by: STUDENT IN AN ORGANIZED HEALTH CARE EDUCATION/TRAINING PROGRAM

## 2025-07-01 PROCEDURE — 710N000010 HC RECOVERY PHASE 1, LEVEL 2, PER MIN: Performed by: STUDENT IN AN ORGANIZED HEALTH CARE EDUCATION/TRAINING PROGRAM

## 2025-07-01 PROCEDURE — 271N000001 HC OR GENERAL SUPPLY NON-STERILE: Performed by: STUDENT IN AN ORGANIZED HEALTH CARE EDUCATION/TRAINING PROGRAM

## 2025-07-01 PROCEDURE — 250N000011 HC RX IP 250 OP 636: Performed by: STUDENT IN AN ORGANIZED HEALTH CARE EDUCATION/TRAINING PROGRAM

## 2025-07-01 RX ORDER — OXYCODONE HYDROCHLORIDE 5 MG/1
5 TABLET ORAL EVERY 4 HOURS PRN
Status: DISCONTINUED | OUTPATIENT
Start: 2025-07-01 | End: 2025-07-03 | Stop reason: HOSPADM

## 2025-07-01 RX ORDER — METOCLOPRAMIDE HYDROCHLORIDE 5 MG/ML
10 INJECTION INTRAMUSCULAR; INTRAVENOUS EVERY 6 HOURS PRN
Status: DISCONTINUED | OUTPATIENT
Start: 2025-07-01 | End: 2025-07-03 | Stop reason: HOSPADM

## 2025-07-01 RX ORDER — LOPERAMIDE HYDROCHLORIDE 2 MG/1
4 CAPSULE ORAL
Status: DISCONTINUED | OUTPATIENT
Start: 2025-07-01 | End: 2025-07-01 | Stop reason: HOSPADM

## 2025-07-01 RX ORDER — SODIUM CHLORIDE, SODIUM LACTATE, POTASSIUM CHLORIDE, CALCIUM CHLORIDE 600; 310; 30; 20 MG/100ML; MG/100ML; MG/100ML; MG/100ML
INJECTION, SOLUTION INTRAVENOUS CONTINUOUS
Status: DISCONTINUED | OUTPATIENT
Start: 2025-07-01 | End: 2025-07-01 | Stop reason: HOSPADM

## 2025-07-01 RX ORDER — MISOPROSTOL 200 UG/1
400 TABLET ORAL
Status: DISCONTINUED | OUTPATIENT
Start: 2025-07-01 | End: 2025-07-01 | Stop reason: HOSPADM

## 2025-07-01 RX ORDER — LOPERAMIDE HYDROCHLORIDE 2 MG/1
2 CAPSULE ORAL
Status: DISCONTINUED | OUTPATIENT
Start: 2025-07-01 | End: 2025-07-03 | Stop reason: HOSPADM

## 2025-07-01 RX ORDER — ONDANSETRON 4 MG/1
4 TABLET, ORALLY DISINTEGRATING ORAL EVERY 30 MIN PRN
Status: DISCONTINUED | OUTPATIENT
Start: 2025-07-01 | End: 2025-07-01 | Stop reason: HOSPADM

## 2025-07-01 RX ORDER — DIPHENHYDRAMINE HCL 25 MG
25 CAPSULE ORAL EVERY 6 HOURS PRN
Status: DISCONTINUED | OUTPATIENT
Start: 2025-07-01 | End: 2025-07-03 | Stop reason: HOSPADM

## 2025-07-01 RX ORDER — NALOXONE HYDROCHLORIDE 0.4 MG/ML
0.2 INJECTION, SOLUTION INTRAMUSCULAR; INTRAVENOUS; SUBCUTANEOUS
Status: DISCONTINUED | OUTPATIENT
Start: 2025-07-01 | End: 2025-07-03 | Stop reason: HOSPADM

## 2025-07-01 RX ORDER — SIMETHICONE 80 MG
80 TABLET,CHEWABLE ORAL EVERY 6 HOURS PRN
Status: DISCONTINUED | OUTPATIENT
Start: 2025-07-01 | End: 2025-07-03 | Stop reason: HOSPADM

## 2025-07-01 RX ORDER — SODIUM CHLORIDE, SODIUM LACTATE, POTASSIUM CHLORIDE, CALCIUM CHLORIDE 600; 310; 30; 20 MG/100ML; MG/100ML; MG/100ML; MG/100ML
INJECTION, SOLUTION INTRAVENOUS
Status: COMPLETED
Start: 2025-07-01 | End: 2025-07-01

## 2025-07-01 RX ORDER — CALCIUM CARBONATE 500 MG/1
1000 TABLET, CHEWABLE ORAL EVERY 6 HOURS PRN
Status: DISCONTINUED | OUTPATIENT
Start: 2025-07-01 | End: 2025-07-03 | Stop reason: HOSPADM

## 2025-07-01 RX ORDER — LOPERAMIDE HYDROCHLORIDE 2 MG/1
4 CAPSULE ORAL
Status: DISCONTINUED | OUTPATIENT
Start: 2025-07-01 | End: 2025-07-03 | Stop reason: HOSPADM

## 2025-07-01 RX ORDER — FENTANYL CITRATE 50 UG/ML
25 INJECTION, SOLUTION INTRAMUSCULAR; INTRAVENOUS EVERY 5 MIN PRN
Status: DISCONTINUED | OUTPATIENT
Start: 2025-07-01 | End: 2025-07-01 | Stop reason: HOSPADM

## 2025-07-01 RX ORDER — KETOROLAC TROMETHAMINE 15 MG/ML
15 INJECTION, SOLUTION INTRAMUSCULAR; INTRAVENOUS EVERY 6 HOURS
Status: COMPLETED | OUTPATIENT
Start: 2025-07-01 | End: 2025-07-02

## 2025-07-01 RX ORDER — MISOPROSTOL 200 UG/1
400 TABLET ORAL
Status: DISCONTINUED | OUTPATIENT
Start: 2025-07-01 | End: 2025-07-03 | Stop reason: HOSPADM

## 2025-07-01 RX ORDER — OXYTOCIN 10 [USP'U]/ML
10 INJECTION, SOLUTION INTRAMUSCULAR; INTRAVENOUS
Status: DISCONTINUED | OUTPATIENT
Start: 2025-07-01 | End: 2025-07-03 | Stop reason: HOSPADM

## 2025-07-01 RX ORDER — ONDANSETRON 4 MG/1
4 TABLET, ORALLY DISINTEGRATING ORAL EVERY 6 HOURS PRN
Status: DISCONTINUED | OUTPATIENT
Start: 2025-07-01 | End: 2025-07-03 | Stop reason: HOSPADM

## 2025-07-01 RX ORDER — LOPERAMIDE HYDROCHLORIDE 2 MG/1
2 CAPSULE ORAL
Status: DISCONTINUED | OUTPATIENT
Start: 2025-07-01 | End: 2025-07-01 | Stop reason: HOSPADM

## 2025-07-01 RX ORDER — ONDANSETRON 2 MG/ML
4 INJECTION INTRAMUSCULAR; INTRAVENOUS EVERY 30 MIN PRN
Status: DISCONTINUED | OUTPATIENT
Start: 2025-07-01 | End: 2025-07-01 | Stop reason: HOSPADM

## 2025-07-01 RX ORDER — BISACODYL 10 MG
10 SUPPOSITORY, RECTAL RECTAL DAILY PRN
Status: DISCONTINUED | OUTPATIENT
Start: 2025-07-03 | End: 2025-07-03 | Stop reason: HOSPADM

## 2025-07-01 RX ORDER — HYDROMORPHONE HCL IN WATER/PF 6 MG/30 ML
0.4 PATIENT CONTROLLED ANALGESIA SYRINGE INTRAVENOUS EVERY 5 MIN PRN
Status: DISCONTINUED | OUTPATIENT
Start: 2025-07-01 | End: 2025-07-01 | Stop reason: HOSPADM

## 2025-07-01 RX ORDER — NALOXONE HYDROCHLORIDE 0.4 MG/ML
0.4 INJECTION, SOLUTION INTRAMUSCULAR; INTRAVENOUS; SUBCUTANEOUS
Status: DISCONTINUED | OUTPATIENT
Start: 2025-07-01 | End: 2025-07-03 | Stop reason: HOSPADM

## 2025-07-01 RX ORDER — IBUPROFEN 800 MG/1
800 TABLET, FILM COATED ORAL EVERY 6 HOURS
Status: DISCONTINUED | OUTPATIENT
Start: 2025-07-02 | End: 2025-07-03 | Stop reason: HOSPADM

## 2025-07-01 RX ORDER — HYDROMORPHONE HCL IN WATER/PF 6 MG/30 ML
0.2 PATIENT CONTROLLED ANALGESIA SYRINGE INTRAVENOUS EVERY 5 MIN PRN
Status: DISCONTINUED | OUTPATIENT
Start: 2025-07-01 | End: 2025-07-01 | Stop reason: HOSPADM

## 2025-07-01 RX ORDER — DIPHENHYDRAMINE HYDROCHLORIDE 50 MG/ML
25 INJECTION, SOLUTION INTRAMUSCULAR; INTRAVENOUS EVERY 6 HOURS PRN
Status: DISCONTINUED | OUTPATIENT
Start: 2025-07-01 | End: 2025-07-03 | Stop reason: HOSPADM

## 2025-07-01 RX ORDER — ACETAMINOPHEN 325 MG/1
975 TABLET ORAL ONCE
Status: COMPLETED | OUTPATIENT
Start: 2025-07-01 | End: 2025-07-01

## 2025-07-01 RX ORDER — ACETAMINOPHEN 325 MG/1
975 TABLET ORAL EVERY 6 HOURS
Status: DISCONTINUED | OUTPATIENT
Start: 2025-07-01 | End: 2025-07-03 | Stop reason: HOSPADM

## 2025-07-01 RX ORDER — HYDROCORTISONE 25 MG/G
CREAM TOPICAL 3 TIMES DAILY PRN
Status: DISCONTINUED | OUTPATIENT
Start: 2025-07-01 | End: 2025-07-03 | Stop reason: HOSPADM

## 2025-07-01 RX ORDER — OXYTOCIN/0.9 % SODIUM CHLORIDE 30/500 ML
340 PLASTIC BAG, INJECTION (ML) INTRAVENOUS CONTINUOUS PRN
Status: DISCONTINUED | OUTPATIENT
Start: 2025-07-01 | End: 2025-07-03 | Stop reason: HOSPADM

## 2025-07-01 RX ORDER — FENTANYL CITRATE 50 UG/ML
INJECTION, SOLUTION INTRAMUSCULAR; INTRAVENOUS
Status: COMPLETED | OUTPATIENT
Start: 2025-07-01 | End: 2025-07-01

## 2025-07-01 RX ORDER — LIDOCAINE 40 MG/G
CREAM TOPICAL
Status: DISCONTINUED | OUTPATIENT
Start: 2025-07-01 | End: 2025-07-01 | Stop reason: HOSPADM

## 2025-07-01 RX ORDER — ESMOLOL HYDROCHLORIDE 10 MG/ML
INJECTION INTRAVENOUS PRN
Status: DISCONTINUED | OUTPATIENT
Start: 2025-07-01 | End: 2025-07-01

## 2025-07-01 RX ORDER — DEXAMETHASONE SODIUM PHOSPHATE 4 MG/ML
4 INJECTION, SOLUTION INTRA-ARTICULAR; INTRALESIONAL; INTRAMUSCULAR; INTRAVENOUS; SOFT TISSUE
Status: DISCONTINUED | OUTPATIENT
Start: 2025-07-01 | End: 2025-07-01 | Stop reason: HOSPADM

## 2025-07-01 RX ORDER — METHYLERGONOVINE MALEATE 0.2 MG/ML
200 INJECTION INTRAVENOUS
Status: DISCONTINUED | OUTPATIENT
Start: 2025-07-01 | End: 2025-07-03 | Stop reason: HOSPADM

## 2025-07-01 RX ORDER — MISOPROSTOL 200 UG/1
800 TABLET ORAL
Status: DISCONTINUED | OUTPATIENT
Start: 2025-07-01 | End: 2025-07-03 | Stop reason: HOSPADM

## 2025-07-01 RX ORDER — BUPIVACAINE HYDROCHLORIDE 7.5 MG/ML
INJECTION, SOLUTION INTRASPINAL
Status: COMPLETED | OUTPATIENT
Start: 2025-07-01 | End: 2025-07-01

## 2025-07-01 RX ORDER — AMOXICILLIN 250 MG
2 CAPSULE ORAL 2 TIMES DAILY
Status: DISCONTINUED | OUTPATIENT
Start: 2025-07-01 | End: 2025-07-03 | Stop reason: HOSPADM

## 2025-07-01 RX ORDER — CEFAZOLIN SODIUM/WATER 2 G/20 ML
2 SYRINGE (ML) INTRAVENOUS SEE ADMIN INSTRUCTIONS
Status: DISCONTINUED | OUTPATIENT
Start: 2025-07-01 | End: 2025-07-01 | Stop reason: HOSPADM

## 2025-07-01 RX ORDER — TRANEXAMIC ACID 10 MG/ML
1 INJECTION, SOLUTION INTRAVENOUS EVERY 30 MIN PRN
Status: DISCONTINUED | OUTPATIENT
Start: 2025-07-01 | End: 2025-07-03 | Stop reason: HOSPADM

## 2025-07-01 RX ORDER — CITRIC ACID/SODIUM CITRATE 334-500MG
30 SOLUTION, ORAL ORAL
Status: COMPLETED | OUTPATIENT
Start: 2025-07-01 | End: 2025-07-01

## 2025-07-01 RX ORDER — METOCLOPRAMIDE 10 MG/1
10 TABLET ORAL EVERY 6 HOURS PRN
Status: DISCONTINUED | OUTPATIENT
Start: 2025-07-01 | End: 2025-07-03 | Stop reason: HOSPADM

## 2025-07-01 RX ORDER — BUPIVACAINE HYDROCHLORIDE 2.5 MG/ML
INJECTION, SOLUTION EPIDURAL; INFILTRATION; INTRACAUDAL; PERINEURAL
Status: COMPLETED | OUTPATIENT
Start: 2025-07-01 | End: 2025-07-01

## 2025-07-01 RX ORDER — TRANEXAMIC ACID 10 MG/ML
1 INJECTION, SOLUTION INTRAVENOUS EVERY 30 MIN PRN
Status: DISCONTINUED | OUTPATIENT
Start: 2025-07-01 | End: 2025-07-01 | Stop reason: HOSPADM

## 2025-07-01 RX ORDER — METRONIDAZOLE 500 MG/1
500 TABLET ORAL 2 TIMES DAILY
Status: DISCONTINUED | OUTPATIENT
Start: 2025-07-01 | End: 2025-07-01

## 2025-07-01 RX ORDER — AMOXICILLIN 250 MG
1 CAPSULE ORAL 2 TIMES DAILY
Status: DISCONTINUED | OUTPATIENT
Start: 2025-07-01 | End: 2025-07-03 | Stop reason: HOSPADM

## 2025-07-01 RX ORDER — OXYTOCIN 10 [USP'U]/ML
10 INJECTION, SOLUTION INTRAMUSCULAR; INTRAVENOUS
Status: DISCONTINUED | OUTPATIENT
Start: 2025-07-01 | End: 2025-07-01 | Stop reason: HOSPADM

## 2025-07-01 RX ORDER — CEFAZOLIN SODIUM/WATER 2 G/20 ML
2 SYRINGE (ML) INTRAVENOUS
Status: COMPLETED | OUTPATIENT
Start: 2025-07-01 | End: 2025-07-01

## 2025-07-01 RX ORDER — SODIUM PHOSPHATE,MONO-DIBASIC 19G-7G/118
1 ENEMA (ML) RECTAL DAILY PRN
Status: DISCONTINUED | OUTPATIENT
Start: 2025-07-03 | End: 2025-07-03 | Stop reason: HOSPADM

## 2025-07-01 RX ORDER — ONDANSETRON 2 MG/ML
4 INJECTION INTRAMUSCULAR; INTRAVENOUS EVERY 6 HOURS PRN
Status: DISCONTINUED | OUTPATIENT
Start: 2025-07-01 | End: 2025-07-03 | Stop reason: HOSPADM

## 2025-07-01 RX ORDER — PROCHLORPERAZINE MALEATE 10 MG
10 TABLET ORAL EVERY 6 HOURS PRN
Status: DISCONTINUED | OUTPATIENT
Start: 2025-07-01 | End: 2025-07-03 | Stop reason: HOSPADM

## 2025-07-01 RX ORDER — GLYCOPYRROLATE 0.2 MG/ML
INJECTION, SOLUTION INTRAMUSCULAR; INTRAVENOUS PRN
Status: DISCONTINUED | OUTPATIENT
Start: 2025-07-01 | End: 2025-07-01

## 2025-07-01 RX ORDER — CARBOPROST TROMETHAMINE 250 UG/ML
250 INJECTION, SOLUTION INTRAMUSCULAR
Status: DISCONTINUED | OUTPATIENT
Start: 2025-07-01 | End: 2025-07-03 | Stop reason: HOSPADM

## 2025-07-01 RX ORDER — DEXTROSE, SODIUM CHLORIDE, SODIUM LACTATE, POTASSIUM CHLORIDE, AND CALCIUM CHLORIDE 5; .6; .31; .03; .02 G/100ML; G/100ML; G/100ML; G/100ML; G/100ML
INJECTION, SOLUTION INTRAVENOUS CONTINUOUS
Status: DISCONTINUED | OUTPATIENT
Start: 2025-07-01 | End: 2025-07-03 | Stop reason: HOSPADM

## 2025-07-01 RX ORDER — MORPHINE SULFATE 1 MG/ML
INJECTION, SOLUTION EPIDURAL; INTRATHECAL; INTRAVENOUS
Status: COMPLETED | OUTPATIENT
Start: 2025-07-01 | End: 2025-07-01

## 2025-07-01 RX ORDER — MISOPROSTOL 200 UG/1
800 TABLET ORAL
Status: DISCONTINUED | OUTPATIENT
Start: 2025-07-01 | End: 2025-07-01 | Stop reason: HOSPADM

## 2025-07-01 RX ORDER — METHYLERGONOVINE MALEATE 0.2 MG/ML
200 INJECTION INTRAVENOUS
Status: DISCONTINUED | OUTPATIENT
Start: 2025-07-01 | End: 2025-07-01 | Stop reason: HOSPADM

## 2025-07-01 RX ORDER — CARBOPROST TROMETHAMINE 250 UG/ML
250 INJECTION, SOLUTION INTRAMUSCULAR
Status: DISCONTINUED | OUTPATIENT
Start: 2025-07-01 | End: 2025-07-01 | Stop reason: HOSPADM

## 2025-07-01 RX ORDER — OXYTOCIN/0.9 % SODIUM CHLORIDE 30/500 ML
100-340 PLASTIC BAG, INJECTION (ML) INTRAVENOUS CONTINUOUS PRN
Status: DISCONTINUED | OUTPATIENT
Start: 2025-07-01 | End: 2025-07-03 | Stop reason: HOSPADM

## 2025-07-01 RX ORDER — LIDOCAINE 40 MG/G
CREAM TOPICAL
Status: DISCONTINUED | OUTPATIENT
Start: 2025-07-01 | End: 2025-07-03 | Stop reason: HOSPADM

## 2025-07-01 RX ORDER — NALOXONE HYDROCHLORIDE 0.4 MG/ML
0.1 INJECTION, SOLUTION INTRAMUSCULAR; INTRAVENOUS; SUBCUTANEOUS
Status: DISCONTINUED | OUTPATIENT
Start: 2025-07-01 | End: 2025-07-01 | Stop reason: HOSPADM

## 2025-07-01 RX ORDER — OXYTOCIN/0.9 % SODIUM CHLORIDE 30/500 ML
340 PLASTIC BAG, INJECTION (ML) INTRAVENOUS CONTINUOUS PRN
Status: DISCONTINUED | OUTPATIENT
Start: 2025-07-01 | End: 2025-07-01 | Stop reason: HOSPADM

## 2025-07-01 RX ORDER — SODIUM CHLORIDE, SODIUM LACTATE, POTASSIUM CHLORIDE, CALCIUM CHLORIDE 600; 310; 30; 20 MG/100ML; MG/100ML; MG/100ML; MG/100ML
INJECTION, SOLUTION INTRAVENOUS CONTINUOUS PRN
Status: DISCONTINUED | OUTPATIENT
Start: 2025-07-01 | End: 2025-07-01

## 2025-07-01 RX ORDER — FENTANYL CITRATE 50 UG/ML
50 INJECTION, SOLUTION INTRAMUSCULAR; INTRAVENOUS EVERY 5 MIN PRN
Status: DISCONTINUED | OUTPATIENT
Start: 2025-07-01 | End: 2025-07-01 | Stop reason: HOSPADM

## 2025-07-01 RX ORDER — ONDANSETRON 2 MG/ML
INJECTION INTRAMUSCULAR; INTRAVENOUS PRN
Status: DISCONTINUED | OUTPATIENT
Start: 2025-07-01 | End: 2025-07-01

## 2025-07-01 RX ORDER — KETOROLAC TROMETHAMINE 30 MG/ML
INJECTION, SOLUTION INTRAMUSCULAR; INTRAVENOUS PRN
Status: DISCONTINUED | OUTPATIENT
Start: 2025-07-01 | End: 2025-07-01

## 2025-07-01 RX ORDER — EPINEPHRINE 1 MG/ML
INJECTION, SOLUTION, CONCENTRATE INTRAVENOUS
Status: COMPLETED | OUTPATIENT
Start: 2025-07-01 | End: 2025-07-01

## 2025-07-01 RX ADMIN — PHENYLEPHRINE HYDROCHLORIDE 100 MCG: 10 INJECTION INTRAVENOUS at 11:41

## 2025-07-01 RX ADMIN — SODIUM CHLORIDE, SODIUM LACTATE, POTASSIUM CHLORIDE, AND CALCIUM CHLORIDE: .6; .31; .03; .02 INJECTION, SOLUTION INTRAVENOUS at 09:34

## 2025-07-01 RX ADMIN — SODIUM CHLORIDE, SODIUM LACTATE, POTASSIUM CHLORIDE, AND CALCIUM CHLORIDE: .6; .31; .03; .02 INJECTION, SOLUTION INTRAVENOUS at 10:43

## 2025-07-01 RX ADMIN — GLYCOPYRROLATE 0.2 MG: 0.2 INJECTION, SOLUTION INTRAMUSCULAR; INTRAVENOUS at 10:59

## 2025-07-01 RX ADMIN — EPINEPHRINE 0.1 MG: 1 INJECTION, SOLUTION, CONCENTRATE INTRAVENOUS at 10:55

## 2025-07-01 RX ADMIN — BUPIVACAINE HYDROCHLORIDE IN DEXTROSE 1.4 ML: 7.5 INJECTION, SOLUTION SUBARACHNOID at 10:55

## 2025-07-01 RX ADMIN — ESMOLOL HYDROCHLORIDE 10 MG: 10 INJECTION, SOLUTION INTRAVENOUS at 11:04

## 2025-07-01 RX ADMIN — PHENYLEPHRINE HYDROCHLORIDE 100 MCG: 10 INJECTION INTRAVENOUS at 11:37

## 2025-07-01 RX ADMIN — PHENYLEPHRINE HYDROCHLORIDE 100 MCG: 10 INJECTION INTRAVENOUS at 11:30

## 2025-07-01 RX ADMIN — ESMOLOL HYDROCHLORIDE 10 MG: 10 INJECTION, SOLUTION INTRAVENOUS at 11:13

## 2025-07-01 RX ADMIN — ONDANSETRON 4 MG: 2 INJECTION INTRAMUSCULAR; INTRAVENOUS at 10:48

## 2025-07-01 RX ADMIN — Medication 2 G: at 10:51

## 2025-07-01 RX ADMIN — SODIUM CHLORIDE, SODIUM LACTATE, POTASSIUM CHLORIDE, AND CALCIUM CHLORIDE 1000 ML: .6; .31; .03; .02 INJECTION, SOLUTION INTRAVENOUS at 17:25

## 2025-07-01 RX ADMIN — PHENYLEPHRINE HYDROCHLORIDE 100 MCG: 10 INJECTION INTRAVENOUS at 10:56

## 2025-07-01 RX ADMIN — PHENYLEPHRINE HYDROCHLORIDE 100 MCG/MIN: 10 INJECTION INTRAVENOUS at 10:55

## 2025-07-01 RX ADMIN — SODIUM CITRATE AND CITRIC ACID MONOHYDRATE 30 ML: 500; 334 SOLUTION ORAL at 10:40

## 2025-07-01 RX ADMIN — BUPIVACAINE HYDROCHLORIDE 20 ML: 2.5 INJECTION, SOLUTION EPIDURAL; INFILTRATION; INTRACAUDAL at 12:15

## 2025-07-01 RX ADMIN — MORPHINE SULFATE 0.15 MG: 1 INJECTION EPIDURAL; INTRATHECAL; INTRAVENOUS at 10:55

## 2025-07-01 RX ADMIN — BUPIVACAINE 20 ML: 13.3 INJECTION, SUSPENSION, LIPOSOMAL INFILTRATION at 12:15

## 2025-07-01 RX ADMIN — ACETAMINOPHEN 975 MG: 325 TABLET ORAL at 10:40

## 2025-07-01 RX ADMIN — SODIUM CHLORIDE, SODIUM LACTATE, POTASSIUM CHLORIDE, AND CALCIUM CHLORIDE: .6; .31; .03; .02 INJECTION, SOLUTION INTRAVENOUS at 11:44

## 2025-07-01 RX ADMIN — FAMOTIDINE 20 MG: 10 INJECTION, SOLUTION INTRAVENOUS at 10:15

## 2025-07-01 RX ADMIN — KETOROLAC TROMETHAMINE 15 MG: 15 INJECTION, SOLUTION INTRAMUSCULAR; INTRAVENOUS at 20:42

## 2025-07-01 RX ADMIN — FENTANYL CITRATE 15 MCG: 50 INJECTION INTRAMUSCULAR; INTRAVENOUS at 10:55

## 2025-07-01 RX ADMIN — ACETAMINOPHEN 975 MG: 325 TABLET ORAL at 17:21

## 2025-07-01 RX ADMIN — PHENYLEPHRINE HYDROCHLORIDE 100 MCG: 10 INJECTION INTRAVENOUS at 11:39

## 2025-07-01 RX ADMIN — KETOROLAC TROMETHAMINE 15 MG: 30 INJECTION, SOLUTION INTRAMUSCULAR at 11:44

## 2025-07-01 RX ADMIN — Medication 300 ML/HR: at 11:25

## 2025-07-01 RX ADMIN — SENNOSIDES AND DOCUSATE SODIUM 1 TABLET: 50; 8.6 TABLET ORAL at 20:43

## 2025-07-01 RX ADMIN — PHENYLEPHRINE HYDROCHLORIDE 100 MCG: 10 INJECTION INTRAVENOUS at 11:44

## 2025-07-01 ASSESSMENT — ACTIVITIES OF DAILY LIVING (ADL)
ADLS_ACUITY_SCORE: 15
ADLS_ACUITY_SCORE: 20
ADLS_ACUITY_SCORE: 41
ADLS_ACUITY_SCORE: 20
ADLS_ACUITY_SCORE: 20
ADLS_ACUITY_SCORE: 15
ADLS_ACUITY_SCORE: 15

## 2025-07-01 NOTE — ANESTHESIA PROCEDURE NOTES
"Intrathecal injection Procedure Note    Pre-Procedure   Staff -        Anesthesiologist:  No He MD       Resident/Fellow: Ros Kay MD       Performed By: resident and with residents       Procedure performed by resident/fellow/CRNA in presence of a teaching physician.         Location: OR       Pre-Anesthestic Checklist: patient identified, IV checked, risks and benefits discussed, informed consent, monitors and equipment checked, pre-op evaluation, at physician/surgeon's request and post-op pain management  Timeout:       Correct Patient: Yes        Correct Procedure: Yes        Correct Site: Yes        Correct Position: Yes   Procedure Documentation  Procedure: intrathecal injection         Patient Position: sitting       Skin prep: Chloraprep       Insertion Site: L3-4. (midline approach).       Needle Gauge: 25.        Needle Length (Inches): 3.5        Spinal Needle Type: Pencan       Introducer used       Introducer: 20 G       # of attempts: 1 and  # of redirects:  0    Assessment/Narrative         Paresthesias: No.       Sensory Level: T6       CSF fluid: clear.       Opening pressure was cmH2O while  Sitting.      Medication(s) Administered   0.75% Hyperbaric Bupivacaine (Intrathecal) - Intrathecal   1.4 mL - 7/1/2025 10:55:00 AM  Epinephrine 1000 mcg/mL (Intrathecal) - Intrathecal   0.1 mg - 7/1/2025 10:55:00 AM  Fentanyl PF (Intrathecal) - Intrathecal   15 mcg - 7/1/2025 10:55:00 AM  Morphine PF 1 mg/mL (Intrathecal) - Intrathecal   0.15 mg - 7/1/2025 10:55:00 AM    FOR [facility] ([facility]) ONLY:   Pain Team Contact information: please page the Pain Team Via SendUs. Search \"Pain\". During daytime hours, please page the attending first. At night please page the resident first.      "

## 2025-07-01 NOTE — PROVIDER NOTIFICATION
Pt is running Bps 80's/50-60's after CS today. Bps ok with anesthesia but can you order parameters when we should call you or if you want to give a bolus. Pt is asymptomatic. Dr. GIULIA Mejia G2 text paged and updated.

## 2025-07-01 NOTE — PROVIDER NOTIFICATION
07/01/25 1315   Provider Notification   Provider Name/Title Dr. Becerra   Method of Notification At Bedside   Notification Reason Vital Signs Change     Provider at bedside to check in on patient as blood pressure is below 90 systolic. Patient asymptomatic. Plan to continue monitoring per routine.

## 2025-07-01 NOTE — PROGRESS NOTES
"SW acknowledges referral:    Reason for referral:  Housing/lodging needs, financial/insurance issues, community resources.     \"Patient has been having trouble with rent as she recently lost her job\"    SW contacted bedside RN, Paige Gonzalez.  Nursing reports patient is being prepared for scheduled .  RN suggests patient be seen tomorrow on [facility].     SW to follow up.    LILIAM Morley [facility]  Clinical   Maternal Child Health  Voicemail:  [phone]  Reachable via [employer]self    "

## 2025-07-01 NOTE — ANESTHESIA CARE TRANSFER NOTE
Patient: Camelia Santillan    Procedure: Procedure(s):   SECTION  Remove cerclage       Diagnosis: History of classical  section [Z98.891]  Diagnosis Additional Information: No value filed.    Anesthesia Type:   Spinal     Note:    Oropharynx: oropharynx clear of all foreign objects and spontaneously breathing  Level of Consciousness: awake  Oxygen Supplementation: nasal cannula  Level of Supplemental Oxygen (L/min / FiO2): 3  Independent Airway: airway patency satisfactory and stable  Dentition: dentition unchanged  Vital Signs Stable: post-procedure vital signs reviewed and stable  Report to RN Given: handoff report given  Patient transferred to: Labor and Delivery    Handoff Report: Identifed the Patient, Identified the Reponsible Provider, Reviewed the pertinent medical history, Discussed the surgical course, Reviewed Intra-OP anesthesia mangement and issues during anesthesia, Set expectations for post-procedure period and Allowed opportunity for questions and acknowledgement of understanding  post-procedure handoff checklist not completed for medical reasons    Vitals:  Vitals Value Taken Time   BP     Temp     Pulse     Resp     SpO2         Electronically Signed By: Ros Kay MD  2025  12:24 PM

## 2025-07-01 NOTE — PLAN OF CARE
delivery of female with Dr. Mejia and Dr. Mejia in attendance. Baby to warmer for assessment. Placenta removed without complication. Cord gases, tissue and cord blood sent. Surgery finished, TAP performed and patient to PACU in stable condition.

## 2025-07-01 NOTE — ANESTHESIA PROCEDURE NOTES
TAP Procedure Note    Pre-Procedure   Staff -        Anesthesiologist:  No He MD       Resident/Fellow: Ros Kay MD       Performed By: resident and with residents       Procedure performed by resident/fellow/CRNA in presence of a teaching physician.         Location: OR       Procedure Start/Stop Times: 7/1/2025 12:15 PM and 7/1/2025 12:20 PM       Pre-Anesthestic Checklist: patient identified, IV checked, site marked, risks and benefits discussed, informed consent, monitors and equipment checked, pre-op evaluation, at physician/surgeon's request and post-op pain management  Timeout:       Correct Patient: Yes        Correct Procedure: Yes        Correct Site: Yes        Correct Position: Yes        Correct Laterality: Yes        Site Marked: Yes  Procedure Documentation  Procedure: TAP         Diagnosis: POST-OP PAIN CONTROL       Laterality: bilateral       Patient Position: supine       Patient Prep/Sterile Barriers: sterile gloves, mask       Skin prep: Chloraprep       Needle Gauge: 21.        Needle Length (millimeters): 110        Ultrasound guided       1. Ultrasound was used to identify targeted nerve, plexus, vascular marker, or fascial plane and place a needle adjacent to it in real-time.       2. Ultrasound was used to visualize the spread of anesthetic in close proximity to the above referenced structure.       3. A permanent image is entered into the patient's record.       4. The visualized anatomic structures appeared normal.       5. There were no apparent abnormal pathologic findings.    Assessment/Narrative         The placement was negative for: blood aspirated, painful injection and site bleeding       Paresthesias: No.       Bolus given via. no blood aspirated via catheter.        Secured via.        Insertion/Infusion Method: Single Shot       Complications: none    Medication(s) Administered   Bupivacaine 0.25% PF (Infiltration) - Infiltration   20 mL - 7/1/2025 12:15:00  "PM  Bupivacaine liposome (Exparel) 1.3% LA inj susp (Infiltration) - Infiltration   20 mL - 7/1/2025 12:15:00 PM  Medication Administration Time: 7/1/2025 12:15 PM      FOR [facility] ONLY:   Pain Team Contact information: please page the Pain Team Via Numerous. Search \"Pain\". During daytime hours, please page the attending first. At night please page the resident first.      "

## 2025-07-01 NOTE — OP NOTE
[facility] - Operative Note    Surgery Date:  2025    Surgeon: Lyndon Mejia MD    Assistants:   - Serenity Mejia MD - PGY2    Pre-op Diagnosis:   - Intrauterine pregnancy at 36w1d  - History of T uterine incision  - Cervical insufficiency with cerclage in place    Post-op Diagnosis:   - Same, s/p procedure    Procedure:  Repeat low-transverse  section with single layer uterine closure via Pfannenstiel incision    Anesthesia: Spinal    EBL: 432 mL    IVF: 1500 mL crystalloid    UOP: 400 mL clear urine at the end of the case    Drains: Ventura Catheter     Specimens: Cord gases    Complications: None apparent    Indications:   Camelia Santillan is a 30 year old  at 36w1d admitted for repeat  section in setting of believed T incision from prior delivery.  The risks, benefits, and alternatives of  section were discussed with the patient, and she agreed to proceed.     Findings:   Minimal adhesions, very filmy adhesions along anterior uterus. Uterus with appearance of scarring confirming former T incision.   Clear amniotic fluid  Liveborn female infant in complete breech presentation. Apgars 7 at 1 minute & 9 at 5 minutes. Weight 2560g.  Normal uterus with appearance of prior scar consistent with T incision, normal fallopian tubes, and ovaries.     Procedure Details:   The patient was brought to the OR, where adequate spinal anesthesia was administered.  She was placed in the dorsal supine position with a slight leftward tilt. She was prepped and draped in the usual sterile fashion. A surgical time out was performed. A pfannenstiel skin incision was made with the scalpel, and carried down to the underlying fascia with sharp and blunt dissection. The fascia was incised in the midline, and the incision was extended laterally with the Head scissors. The superior aspect of the fascia was grasped with the Kocher clamps and dissected off of the  underlying rectus muscles with blunt and sharp dissection. Attention was then turned to the inferior aspect of the fascia, which was similarly dissected off of the underlying rectus muscles. The rectus muscles were  in the midline, and the peritoneum was entered bluntly, and the opening was extended with digital pressure. The bladder blade was placed. The vesicouterine peritoneum was incised in the midline, and the incision was extended laterally with the Metzenbaum scissors. A bladder flap was created digitally and the bladder blade was replaced. A transverse hysterotomy was made with the scalpel in the lower uterine segment, and the incision was extended with digital pressure. The infant was noted to be in the breech position, and was delivered atraumatically. The shoulders delivered easily. The cord was doubly clamped and cut, and the infant was handed off to the awaiting NICU staff. The placenta was delivered with gentle traction on the umbilical cord and uterine massage. The uterus was exteriorized and cleared of all clots and debris. Uterine tone was noted to be adequate with 30 units of pitocin given through the running IV and uterine massage.  The hysterotomy was closed with a running locked suture of 0 Vicryl.  A small sinus was oozing along the hysterotomy, this was made hemostatic using an 0 Monocryl suture. The hysterotomy was noted to be hemostatic. The posterior cul-de-sac was cleared of all clots and debris. The uterus was returned to the abdomen. The pericolic gutters were cleared of all clots and debris. The hysterotomy was reexamined and noted to be hemostatic. The fascia and rectus muscles were examined and areas of oozing were controlled with electrocautery. The fascia was closed with a running 0 Vicryl suture. The subcutaneous tissue was irrigated and areas of oozing were controlled with electrocautery. The skin was closed with 4-0 Monocryl and covered with a sterile dressing.    All  sponge, needle, and instrument counts were correct. The patient tolerated the procedure well, and was transferred to recovery in stable condition. I was present and scrubbed for the entirety of the procedure.     Lyndon Mejia MD

## 2025-07-01 NOTE — ANESTHESIA POSTPROCEDURE EVALUATION
Patient: Camelia Santillan    Procedure: Procedure(s):   SECTION  Remove cerclage       Anesthesia Type:  Spinal    Note:  Disposition: Inpatient   Postop Pain Control: Uneventful            Sign Out: Well controlled pain   PONV:    Neuro/Psych: Uneventful            Sign Out: Acceptable/Baseline neuro status   Airway/Respiratory: Uneventful            Sign Out: Acceptable/Baseline resp. status   CV/Hemodynamics: Uneventful            Sign Out: Acceptable CV status; No obvious hypovolemia; No obvious fluid overload   Other NRE: NONE   DID A NON-ROUTINE EVENT OCCUR?            Last vitals:  Vitals Value Taken Time   BP 84/60 25 13:45   Temp 36.5  C (97.7  F) 25 13:45   Pulse 61 25 13:34   Resp 16 25 13:45   SpO2 97 % 25 13:45   Vitals shown include unfiled device data.    Patient Vitals for the past 24 hrs:   BP Temp Temp src Pulse Resp SpO2   25 1535 (!) 85/58 36.7  C (98.1  F) Oral 59 16 100 %   25 1453 (!) 84/64 36.6  C (97.8  F) Oral 56 16 99 %   25 1422 (!) 86/64 36.5  C (97.7  F) Oral 60 16 97 %   25 1345 (!) 84/60 36.5  C (97.7  F) Oral -- 16 97 %   25 1330 (!) 85/65 -- -- 62 13 98 %   25 1315 (!) 86/60 -- -- 62 16 98 %   25 1300 89/68 -- -- 65 19 95 %   25 1245 93/77 -- -- 67 16 99 %   25 1230 96/73 36.4  C (97.6  F) Oral 72 18 98 %   25 0900 102/61 36.5  C (97.7  F) Oral -- 18 --       Electronically Signed By: No He MD  2025  4:13 PM

## 2025-07-01 NOTE — DISCHARGE SUMMARY
[facility] Discharge Summary    Camelia Santillan MRN# [id number]   Age: 30 year old YOB: 1995     Date of Admission:  2025  Date of Discharge::  7/3/2025  Admitting Physician:  Lyndon Mejia MD  Discharge Physician:  Adrianne Chun MD             Admission Diagnoses:   -  at 36w1d  - Hx of  birth x3  - History of T uterine incision  - Cervical insufficiency with Leslie cerclage.   - Breech position  - Bacterial vaginosis          Discharge Diagnosis:   - , now postpartum via procedure below           Procedures:   - Repeat low transverse  section with single layer closure via Pfannenstiel skin incision  - Spinal anesthesia  - Leslie cervical cerclage removal  - Nexplanon insertion in right arm           Medications Prior to Admission:     Medications Prior to Admission   Medication Sig Dispense Refill Last Dose/Taking    etonogestrel (NEXPLANON) 68 MG IMPL 1 each (68 mg) by Subdermal route See Admin Instructions.   Taking    metoclopramide (REGLAN) 10 MG tablet Take 1 tablet (10 mg) by mouth every 6 hours as needed for nausea or vomiting. 20 tablet 0 More than a month    Multiple Vitamin (MULTIVITAMIN ADULT PO) Take 1 tablet by mouth.   2025    [DISCONTINUED] metroNIDAZOLE (FLAGYL) 500 MG tablet Take 1 tablet (500 mg) by mouth 2 times daily for 7 days. 14 tablet 0 More than a month             Discharge Medications:        Review of your medicines        START taking        Dose / Directions   acetaminophen 325 MG tablet  Commonly known as: TYLENOL      Dose: 650 mg  Take 2 tablets (650 mg) by mouth every 6 hours as needed for mild pain. Start after Delivery.  Quantity: 100 tablet  Refills: 0     etonogestrel 68 MG Impl  Commonly known as: NEXPLANON      Dose: 1 each  1 each (68 mg) by Subdermal route See Admin Instructions.  Refills: 0     ibuprofen 600 MG tablet  Commonly known as: ADVIL/MOTRIN      Dose: 600 mg  Take 1 tablet (600  mg) by mouth every 6 hours as needed for moderate pain. Start after delivery  Quantity: 60 tablet  Refills: 0     oxyCODONE 5 MG tablet  Commonly known as: ROXICODONE  Used for: S/P  section      Dose: 5 mg  Take 1 tablet (5 mg) by mouth every 4 hours as needed for moderate to severe pain.  Quantity: 3 tablet  Refills: 0     senna-docusate 8.6-50 MG tablet  Commonly known as: SENOKOT-S/PERICOLACE      Dose: 1 tablet  Take 1 tablet by mouth daily. Start after delivery.  Quantity: 100 tablet  Refills: 0     simethicone 80 MG chewable tablet  Commonly known as: MYLICON  Used for: S/P  section      Dose: 80 mg  Take 1 tablet (80 mg) by mouth every 6 hours as needed for other (abdominal gas).  Quantity: 30 tablet  Refills: 0            CONTINUE these medicines which have NOT CHANGED        Dose / Directions   metoclopramide 10 MG tablet  Commonly known as: REGLAN  Used for: Acute cystitis without hematuria      Dose: 10 mg  Take 1 tablet (10 mg) by mouth every 6 hours as needed for nausea or vomiting.  Quantity: 20 tablet  Refills: 0     MULTIVITAMIN ADULT PO      Dose: 1 tablet  Take 1 tablet by mouth.  Refills: 0            STOP taking      metroNIDAZOLE 500 MG tablet  Commonly known as: FLAGYL                  Where to get your medicines        These medications were sent to [facility] [place], [place] - [address]  [address] [address]      Phone: [phone]   acetaminophen 325 MG tablet  ibuprofen 600 MG tablet  oxyCODONE 5 MG tablet  senna-docusate 8.6-50 MG tablet  simethicone 80 MG chewable tablet               Consultations:   Anesthesiology          Brief Admission and Intrapartum History:   Camelia Santillan is a 30 year old now  who presented at 36w1d for planned repeat C section in setting of believed T incision from prior delivery and breech position. She also has a history of  birth x3 and cervical insufficency s/p  Leslie cerclage placement 3/14.        Intraoperative course   The procedure was uncomplicated.   mL.  Infant was in breech position and was delivered atraumatically. See operative report for details.     Intraoperative findings  Minimal adhesions, very filmy adhesions along anterior uterus. Uterus with appearance of scarring confirming former T incision.   Clear amniotic fluid  Liveborn female infant in complete breech presentation. Apgars 7 at 1 minute & 9 at 5 minutes. Weight 2560g.  Normal uterus with appearance of prior scar consistent with T incision, normal fallopian tubes, and ovaries.        Postpartum Course   The patient's hospital course was unremarkable.  She recovered as anticipated and experienced no post-operative complications.  On discharge, her pain was well controlled. Vaginal bleeding was appropriate for postpartum period.  Voiding without difficulty.  Passing flatus and had a bowel movement.  Ambulating well and tolerating a normal diet without nausea or emesis.  No fever or significant wound drainage.  Breastfeeding well.  Infant is stable. She received a nexplanon for contraception on POD#1. Rh pos, rhogam not indicated.     She was discharged on post-partum day #2.    Post-partum hemoglobin:   Hemoglobin   Date Value Ref Range Status   07/02/2025 10.8 (L) 11.7 - 15.7 g/dL Final             Discharge Instructions and Follow-Up:     1) Diet: Regular  2) Feeding: breast  3) Contraception: s/p nexplanon placement  4) Activity: No lifting greater than 20 lbs, pushing, pulling, or other strenuous activity for 6 weeks. Pelvic rest for 6 weeks including no sexual intercourse, tampons, or douching. No driving until you can slam on the breaks without pain or while on narcotic pain medications.  5) Wound care: Keep incision clean and dry.   6) Precautions: Call for temperature > 100.4, bright red vaginal bleeding >1 pad an hour x 2 hours, foul smelling vaginal discharge, pain not controlled by  usual oral pain meds, persistent nausea and vomiting not controlled on medications, drainage or redness from incision site  7) Follow-up:  - Primary OB in 6 weeks for routine postpartum visit           Discharge Disposition:     Discharged to home      Veronica Colon MD, MSc  [facility], PGY-2  07/03/2025 6:39 AM       Appreciate note by Dr. Colon. Patient has been seen and examined by me separate from the resident, agree with above note.     Adrianne Chun MD  10:29 AM

## 2025-07-01 NOTE — PLAN OF CARE
Data: Camelia Santillan transferred to room 7131 via zoomcart at 1420. Baby in NICU, visited infant on transfer to [facility].  Action: Receiving unit notified of transfer: Yes. Patient and family notified of room change. Report given by Paige ERNST to Bettina ERNST. Belongings sent to receiving unit. Accompanied by Registered Nurse. Oriented patient to surroundings. Call light within reach.   Response: Patient tolerated transfer and is stable.

## 2025-07-01 NOTE — PLAN OF CARE
Patient here for scheduled c/s. EFM applied with permission; presumed adequate fetal oxygenation at this time. Prep for surgery started immediately, patient accompanied by her spouse. Providers aware and consents in process, along with labs. Plan for 1030 case.

## 2025-07-01 NOTE — PLAN OF CARE
Data: Camelia Santillan transferred to 7131 via cart at 1346. Baby in NICU.  Action: Receiving unit notified of transfer: Yes. Patient and family notified of room change. Report given to Bettina VINCENT RN, at 1325. Belongings sent to receiving unit. Accompanied by Registered Nurse. Oriented patient to surroundings. Call light within reach.   Response: Patient tolerated transfer and is stable.

## 2025-07-01 NOTE — PLAN OF CARE
Goal Outcome Evaluation:      Plan of Care Reviewed With: patient        Problem: Postpartum ( Delivery)  Goal: Optimal Pain Control and Function  Outcome: Progressing  Intervention: Prevent or Manage Pain  Recent Flowsheet Documentation  Taken 2025 1423 by Bettina Ortiz RN  Perineal Care:   perineum cleansed   absorbent brief/pad change  Care [id number]  Bps 80-60's-see provider notification. Afebrile. Denies dizziness. Denies pain at the moment. Ventura with some urine output. Tolerating regular diet and fluids.  Encouraged to drink. Encounter with . Lunch ordered. Will continue to monitor.

## 2025-07-01 NOTE — H&P
OB History and Physical     Camelia Santillan    MRN# [id number]  YOB: 1995      HPI: Camelia Santillan is a 30 year old  at 36w1d by LMP c/w 9w4d US who presents today for scheduled repeat  section and cerclage removal .    Her previous pregnancies were notable for history of CS x1 w/ T incision into active segment, history of  birth x3, history of D&E at 19 weeks due to placental abruption, history and US indicated cerclage in place. Denies history of postpartum hemorrhage, high blood pressure, or asthma.    She reports good fetal movement. Denies LOF, vaginal bleeding, or regular contractions. No concerns for headache, vision changes, chest pain, SOB, RUQ or epigastric pain      Pregnancy notable for:   - Hx CS x1 w/ T incision into active segment  - Hx  birth x3  - Hx of D&E at 19 weeks due to placental abruption  - History and US indicated Leslie cerclage in place  - Rubella non-immune  - Varicella non-immune  - Breech fetal presentation    Prenatal Labs:   Lab Results   Component Value Date    AS Negative 2025    HEPBANG Nonreactive 01/10/2025    CHPCRT Negative 01/15/2025    GCPCRT Negative 01/15/2025    HGB 10.8 (L) 2025     GBS Status: Negative    OBHX:   OB History    Para Term  AB Living   5 3 0 3 1 3   SAB IAB Ectopic Multiple Live Births   0 1 0 0 3      # Outcome Date GA Lbr Larry/2nd Weight Sex Type Anes PTL Lv   5 Current            4 IAB  20w0d    IAB Gen        Birth Comments: bleeding previa/hemorrhage elected for termination      Complications: Placenta Previa, Hemorrhage, Abruptio Placenta   3  18 30w0d   M CS-Unspec   RYLIE      Birth Comments: in [facility] in hospital   2  14 30w0d  2.4 kg (5 lb 4.7 oz) M Vag-Spont   RYLIE      Birth Comments: in hospital in [facility]   1  13 28w0d   M Vag-Spont   RYLIE      Birth Comments: In hospital in [facility] placentia previa        MedicalHX:   History reviewed. No pertinent past medical history.    SurgicalHX:   Past Surgical History:   Procedure Laterality Date    CERCLAGE CERVICAL N/A 3/14/2025    Procedure: CERCLAGE, CERVIX, VAGINAL APPROACH;  Surgeon: Ngozi Bonner MD;  Location: [facility] L+D     SECTION  2018       Medications:   Current Facility-Administered Medications   Medication Dose Route Frequency Provider Last Rate Last Admin    acetaminophen (TYLENOL) tablet 975 mg  975 mg Oral Once Lyndon Mejia MD        BUPivacaine liposome (EXPAREL) 1.3 % LA inj susp 20 mL  20 mL Infiltration DURING SURGERY Marilyn Becerra DO        carboprost (HEMABATE) injection 250 mcg  250 mcg Intramuscular Q15 Min PRN Lyndon Mejia MD        And    loperamide (IMODIUM) capsule 4 mg  4 mg Oral Once PRN Lyndon Mejia MD        ceFAZolin Sodium (ANCEF) injection 2 g  2 g Intravenous Pre-Op/Pre-procedure x 1 dose Lyndon Mejia MD        ceFAZolin Sodium (ANCEF) injection 2 g  2 g Intravenous See Admin Instructions Lyndon Mejia MD        lactated ringers BOLUS 500 mL  500 mL Intravenous Once Lyndon Mejia MD        lactated ringers infusion   Intravenous Continuous Lyndon Mejia MD        lactated ringers infusion   Intravenous Continuous Marilyn Becerra  mL/hr at 25 0934 New Bag at 25 0934    lidocaine (LMX4) cream   Topical Q1H PRN Lyndon Mejia MD        lidocaine (LMX4) cream   Topical Q1H PRN Marilyn Becerra DO        lidocaine 1 % 0.1-1 mL  0.1-1 mL Other Q1H PRN Lyndon Mejia MD        lidocaine 1 % 0.1-1 mL  0.1-1 mL Other Q1H PRN Marilyn Becerra, DO        loperamide (IMODIUM) capsule 2 mg  2 mg Oral Q2H PRN Lyndon Mejia MD        methylergonovine (METHERGINE) injection 200 mcg  200 mcg Intramuscular Q2H PRN Lyndon Mejia MD        misoprostol (CYTOTEC) tablet 400 mcg  400 mcg Oral ONCE PRN REPEAT PER INSTRUCTIONS Lyndon Mejia MD        Or    misoprostol  (CYTOTEC) tablet 800 mcg  800 mcg Rectal ONCE PRN REPEAT PER INSTRUCTIONS Lyndon Mejia MD        oxytocin (PITOCIN) 30 units in 500 mL 0.9% NaCl infusion  340 mL/hr Intravenous Continuous PRN Lyndon Mejia MD        oxytocin (PITOCIN) injection 10 Units  10 Units Intramuscular Once PRN Lyndon Mejia MD        sodium chloride (PF) 0.9% PF flush 3 mL  3 mL Intracatheter Q8H Lyndon Mejia MD        sodium chloride (PF) 0.9% PF flush 3 mL  3 mL Intracatheter q1 min prn Lyndon Mejia MD        sodium chloride (PF) 0.9% PF flush 3 mL  3 mL Intracatheter Q8H EARLINE Marilyn Becerra,         sodium chloride (PF) 0.9% PF flush 3 mL  3 mL Intracatheter q1 min prn Marilyn Becerra DO        sodium citrate-citric acid (BICITRA) solution 30 mL  30 mL Oral Pre-Op/Pre-procedure x 1 dose Lyndon Mejia MD        tranexamic acid 1 g in 100 mL NS IV bag (premix)  1 g Intravenous Q30 Min PRN Lyndon Mejia MD         Allergies:  Allergies   Allergen Reactions    Penicillins Rash     FamilyHX:  History reviewed. No pertinent family history.    SocialHX:   Social History     Socioeconomic History    Marital status:      Spouse name: None    Number of children: None    Years of education: None    Highest education level: None   Tobacco Use    Smoking status: Never     Passive exposure: Never    Smokeless tobacco: Never   Substance and Sexual Activity    Alcohol use: Not Currently    Drug use: Not Currently    Sexual activity: Yes     Partners: Male     Birth control/protection: None     Social Drivers of Health     Financial Resource Strain: Low Risk  (7/1/2025)    Financial Resource Strain     Within the past 12 months, have you or your family members you live with been unable to get utilities (heat, electricity) when it was really needed?: No   Food Insecurity: Low Risk  (7/1/2025)    Food Insecurity     Within the past 12 months, did you worry that your food would run out before you got money to buy  more?: No     Within the past 12 months, did the food you bought just not last and you didn t have money to get more?: No   Transportation Needs: Low Risk  (7/1/2025)    Transportation Needs     Within the past 12 months, has lack of transportation kept you from medical appointments, getting your medicines, non-medical meetings or appointments, work, or from getting things that you need?: No   Physical Activity: Not on File (9/16/2024)    Received from [employer]    Physical Activity     Physical Activity: 0   Stress: Not on File (9/16/2024)    Received from [employer]    Stress     Stress: 0   Social Connections: Not on File (9/16/2024)    Received from [employer]    Social Connections     Connectedness: 0   Interpersonal Safety: Low Risk  (7/1/2025)    Interpersonal Safety     Do you feel physically and emotionally safe where you currently live?: Yes     Within the past 12 months, have you been hit, slapped, kicked or otherwise physically hurt by someone?: No     Within the past 12 months, have you been humiliated or emotionally abused in other ways by your partner or ex-partner?: No   Housing Stability: High Risk (7/1/2025)    Housing Stability     Do you have housing? : Yes     Are you worried about losing your housing?: Yes     ROS: 10 point ROS negative other than above    Physical Exam:  Patient Vitals for the past 24 hrs:   BP Temp Temp src Resp   07/01/25 0900 102/61 97.7  F (36.5  C) Oral 18     Physical Exam:  No data found.  General: NAD, appears generally well  CV: Appears well perfused  Lungs: Normal work of breathing on room air  Abdomen: soft, gravid, non-tender, EFW 6 lbs; breech with head to the maternal left by BSUS    FHT: baseline 135, moderate variability, accels present, no decels   [place]: 0-1 contractions in ten minutes    Labs:   Results for orders placed or performed during the hospital encounter of 07/01/25   CBC with platelets     Status: Abnormal   Result Value Ref Range    WBC Count 6.1 4.0 - 11.0  10e3/uL    RBC Count 3.39 (L) 3.80 - 5.20 10e6/uL    Hemoglobin 10.8 (L) 11.7 - 15.7 g/dL    Hematocrit 31.8 (L) 35.0 - 47.0 %    MCV 94 78 - 100 fL    MCH 31.9 26.5 - 33.0 pg    MCHC 34.0 31.5 - 36.5 g/dL    RDW 12.7 10.0 - 15.0 %    Platelet Count 239 150 - 450 10e3/uL   Adult Type and Screen     Status: None (Preliminary result)   Result Value Ref Range    SPECIMEN EXPIRATION DATE 2025 11:59:00 PM CDT    ABO/Rh type and screen     Status: None (In process)    Narrative    The following orders were created for panel order ABO/Rh type and screen.  Procedure                               Abnormality         Status                     ---------                               -----------         ------                     Adult Type and Screen[[id number]]                           Preliminary result           Please view results for these tests on the individual orders.     Assessment & Plan: 30 year old  at 36w1d by LMP c/w 9w4d US, here for scheduled repeat  section and cerclage removal. Pregnancy is notable for history of CS x1 w/ T incision into active segment, history of  birth x3, history of D&E at 19 weeks due to placental abruption, history and US indicated cerclage in place.     # Scheduled Repeat  Section  # Breech fetal presentation  # Hx classical uterine incision  Indicated due to repeat with T incision into the active segment and breech fetal presentation. Prior  for fetal malpresentation. Previous op notes not available, surgery done in [facility]. Will plan for a Pfannenstiel skin incision with low transverse hysterotomy  - Labs: CBC, T&S, RPR   - Pre-op Hgb 10.8, Plts 239  - Placenta: anterior  - Anesthesia: spinal  - 2g Ancef   - PPH Meds/Ppx: no contraindications to standard medications   - Diet: NPO  - PPx: SCDs  - Consent: Discussed risks and benefits of procedure, including but not limited to bleeding, infection, injury to surrounding organs, injury to  infant, and the potential need for another surgery should some injury go unrecognized or patient were to have continued bleeding. Patient had time to ask questions and expressed understanding of procedure and associated risks. Agreed to blood transfusion if necessary. Verbally consented for hysterectomy in the event of uncontrolled bleeding. Consent signed. Patient additionally consented for cerclage removal.    # Hx  birth x3  # History and US indicated Leslie cerclage in place  - Patient has a history of 3x  deliveries, likely due to cervical insufficiency, and a history of a 19 week delivery likely due to placental abruption.   - Leslie cerclage with one suture tried at the 12 o'clock position in place. Will plan to remove at the time of  delivery.    # PNC  - Rh positive, GBS negative, GCT normal  - Rubella non-immune, will offer MMR vaccine prior to discharge.  - Varicella non-immune  - Other prenatal labs wnl     # FWB:   Cat 1 tracing, 1; breech with head to the maternal left by BSUS; EFW 6 lbs    Patient discussed with Dr. Roberto Mejia MD PGY2  [facility]  Obstetrics, Gynecology, & Women's Health    Appreciate note by Dr. Mejia. Patient has been seen and examined by me separate from the resident, agree with above note.     Lyndon Mejia MD  10:12 AM

## 2025-07-02 ENCOUNTER — APPOINTMENT (OUTPATIENT)
Dept: INTERPRETER SERVICES | Facility: CLINIC | Age: 30
End: 2025-07-02
Payer: COMMERCIAL

## 2025-07-02 ENCOUNTER — VIRTUAL VISIT (OUTPATIENT)
Dept: INTERPRETER SERVICES | Facility: CLINIC | Age: 30
End: 2025-07-02
Payer: COMMERCIAL

## 2025-07-02 LAB
HGB BLD-MCNC: 10.8 G/DL (ref 11.7–15.7)
MCV RBC AUTO: 94 FL (ref 78–100)

## 2025-07-02 PROCEDURE — 0JHD3HZ INSERTION OF CONTRACEPTIVE DEVICE INTO RIGHT UPPER ARM SUBCUTANEOUS TISSUE AND FASCIA, PERCUTANEOUS APPROACH: ICD-10-PCS | Performed by: OBSTETRICS & GYNECOLOGY

## 2025-07-02 PROCEDURE — 99207 PR NO BILLABLE SERVICE THIS VISIT: CPT | Performed by: OBSTETRICS & GYNECOLOGY

## 2025-07-02 PROCEDURE — 36415 COLL VENOUS BLD VENIPUNCTURE: CPT

## 2025-07-02 PROCEDURE — 120N000002 HC R&B MED SURG/OB UMMC

## 2025-07-02 PROCEDURE — T1013 SIGN LANG/ORAL INTERPRETER: HCPCS | Mod: U4,TEL,95

## 2025-07-02 PROCEDURE — 250N000009 HC RX 250

## 2025-07-02 PROCEDURE — 250N000013 HC RX MED GY IP 250 OP 250 PS 637

## 2025-07-02 PROCEDURE — T1013 SIGN LANG/ORAL INTERPRETER: HCPCS | Mod: GT,TEL,95

## 2025-07-02 PROCEDURE — 85018 HEMOGLOBIN: CPT

## 2025-07-02 PROCEDURE — 250N000011 HC RX IP 250 OP 636

## 2025-07-02 RX ADMIN — ETONOGESTREL 68 MG: 68 IMPLANT SUBCUTANEOUS at 20:24

## 2025-07-02 RX ADMIN — LIDOCAINE HYDROCHLORIDE 3 ML: 10 INJECTION, SOLUTION EPIDURAL; INFILTRATION; INTRACAUDAL; PERINEURAL at 20:26

## 2025-07-02 RX ADMIN — ACETAMINOPHEN 975 MG: 325 TABLET ORAL at 18:06

## 2025-07-02 RX ADMIN — SIMETHICONE 80 MG: 80 TABLET, CHEWABLE ORAL at 18:06

## 2025-07-02 RX ADMIN — SENNOSIDES AND DOCUSATE SODIUM 1 TABLET: 50; 8.6 TABLET ORAL at 20:45

## 2025-07-02 RX ADMIN — IBUPROFEN 800 MG: 800 TABLET, FILM COATED ORAL at 14:35

## 2025-07-02 RX ADMIN — IBUPROFEN 800 MG: 800 TABLET, FILM COATED ORAL at 02:45

## 2025-07-02 RX ADMIN — ACETAMINOPHEN 975 MG: 325 TABLET ORAL at 11:45

## 2025-07-02 RX ADMIN — ACETAMINOPHEN 975 MG: 325 TABLET ORAL at 06:00

## 2025-07-02 RX ADMIN — IBUPROFEN 800 MG: 800 TABLET, FILM COATED ORAL at 08:28

## 2025-07-02 RX ADMIN — IBUPROFEN 800 MG: 800 TABLET, FILM COATED ORAL at 20:44

## 2025-07-02 RX ADMIN — ACETAMINOPHEN 975 MG: 325 TABLET ORAL at 00:11

## 2025-07-02 RX ADMIN — SENNOSIDES AND DOCUSATE SODIUM 2 TABLET: 50; 8.6 TABLET ORAL at 08:27

## 2025-07-02 ASSESSMENT — ACTIVITIES OF DAILY LIVING (ADL)
ADLS_ACUITY_SCORE: 20
ADLS_ACUITY_SCORE: 19
ADLS_ACUITY_SCORE: 20
ADLS_ACUITY_SCORE: 19
ADLS_ACUITY_SCORE: 20
ADLS_ACUITY_SCORE: 19
ADLS_ACUITY_SCORE: 19
ADLS_ACUITY_SCORE: 20
ADLS_ACUITY_SCORE: 19
ADLS_ACUITY_SCORE: 20
ADLS_ACUITY_SCORE: 19
ADLS_ACUITY_SCORE: 20
ADLS_ACUITY_SCORE: 19
ADLS_ACUITY_SCORE: 19
ADLS_ACUITY_SCORE: 20
ADLS_ACUITY_SCORE: 19
ADLS_ACUITY_SCORE: 20

## 2025-07-02 NOTE — PLAN OF CARE
Goal Outcome Evaluation:      Plan of Care Reviewed With: patient    Overall Patient Progress: improving    Vital signs WDL with the exception of BP, see flowsheet. Postpartum checks: WDL. Incision: UTV, scant drainage marked on dressing. Pt eating and drinking without issue. Pt ambulating but unable to void, straight catheter x 2, see note. Pt performing self-cares. Pt medicated for pain during the shift. Pt denied passing gas. Breastfeeding and supplementing with donor milk, it is going well. Positive attachment behaviors observed with infant. Support person present.

## 2025-07-02 NOTE — PROGRESS NOTES
[facility]   Post-partum Progress Note    Name:  Camelia Santillan  MRN: [id number]    S: Camelia is doing well this morning.  Pain is well controlled, down to a 3/10 from 5/10 last night. She was unable to void and was straight cathed x2, still unable to urinate this morning. Tolerating regular diet without nausea or vomiting.  Ambulating without without any issues, no dizziness.  Lochia is within expected limits.  Passing flatus. Denies any fever, chills, SOB, chest pain, N/V, headache, vision changes, RUQ pain, dizziness. Has used depo in the past for birth control but interested in using nexplanon this time.    O:   Patient Vitals for the past 24 hrs:   BP Temp Temp src Pulse Resp SpO2   07/02/25 0420 106/69 98  F (36.7  C) Oral 53 16 --   07/02/25 0030 91/67 -- -- 56 -- --   07/02/25 0011 (!) 84/60 98.8  F (37.1  C) Oral 58 16 99 %   07/01/25 1956 103/74 98.4  F (36.9  C) Oral 58 16 100 %   07/01/25 1845 (!) 85/52 98.2  F (36.8  C) Oral 56 16 99 %   07/01/25 1735 (!) 83/56 97.6  F (36.4  C) Oral 58 16 100 %   07/01/25 1646 (!) 77/50 97.9  F (36.6  C) Oral 58 16 100 %   07/01/25 1535 (!) 85/58 98.1  F (36.7  C) Oral 59 16 100 %   07/01/25 1453 (!) 84/64 97.8  F (36.6  C) Oral 56 16 99 %   07/01/25 1422 (!) 86/64 97.7  F (36.5  C) Oral 60 16 97 %   07/01/25 1345 (!) 84/60 97.7  F (36.5  C) Oral -- 16 97 %   07/01/25 1330 (!) 85/65 -- -- 62 13 98 %   07/01/25 1315 (!) 86/60 -- -- 62 16 98 %   07/01/25 1300 89/68 -- -- 65 19 95 %   07/01/25 1245 93/77 -- -- 67 16 99 %   07/01/25 1230 96/73 97.6  F (36.4  C) Oral 72 18 98 %   07/01/25 0900 102/61 97.7  F (36.5  C) Oral -- 18 --     Gen:  Resting comfortably, NAD  CV:  RR, well perfused  Pulm:  Non-labored breathing on room air  Abd:  Soft, appropriately ttp, non-distended.Fundus at umbilicus, firm and non-tender.   Incision: Bandage overlying without shadowing   Ext:  non-tender, trace edema to bilateral lower  extremities    Weight:   There were no vitals filed for this visit.    I/O last 3 completed shifts:  In: 1521.45 [I.V.:1521.45]  Out: 3557 [Urine:3125; Blood:432]    Labs:  Hemoglobin   Date Value Ref Range Status   2025 10.8 (L) 11.7 - 15.7 g/dL Final   2025 10.8 (L) 11.7 - 15.7 g/dL Final       Rubella Antibody IgG   Date Value Ref Range Status   01/10/2025 No detectable antibody.  Final       Assessment/Plan:  Camelia Santillan 30 year old  on POD#1 s/p RLTCS and removal cerclage. Pregnancy was complicated by rubella and varicella NI and post partum course has been complicated by lack of spontaneous void s/p souza. Meeting early post operative goals with plan for discharge home    # Postpartum management  Pain: Well-controlled with ibuprofen, tylenol, and oxycodone PRN   GI:  EARLINE bowel regimen, anti-emetics  : See below  Hgb: Hgb 10.8> >10.8.   Rh: Positive  Rubella: NI, Varicella NI, offer vaccine prior to discharge   Feed: Breast feeding  Infant:  Stable in room.   BC: Desires nexplanon, interested in getting PTD. Discussed recommended pregnancy spacing of 18 months.  PPx:  Encourage ambulation, IS, SCDs while confined to bed    # POUR  - Straight cath x2, if unable to void at next check will need souza replaced    # BV, s/p 3D flagyl  - Asymptomatic     # Asymptomatic hypotension   - Blood pressures around 80/50s, she has been asymptomatic with normal heart rate and from chart review she runs low.   - NTD    Medically Ready for Discharge: Anticipated Tomorrow     Klever Weeks, Medical Student     Resident/Fellow Attestation   I, Veronica Colon MD, was present with the medical student who participated in the service and in the documentation of the note.  I have verified the history and personally performed the physical exam and medical decision making.  I agree with the assessment and plan of care as documented in the note, and have made changes to the above note  accordingly.    Veronica Colon MD, MSc  [facility], PGY-2  07/02/2025 8:40 AM    Staff MD Note    I appreciate the note by Dr. Colon.  Any necessary changes have been made by me.  I saw and evaluated the patient separately from the resident and agree with the findings and plan of care as documented in the note.  Patient making appropriate progress for POD#1 aside from POUR and discussed plan for souza replacement if unable to void by 8:30 AM.  Hgb stable this morning.  Patient without concerns at this time,     Danielle Roca MD

## 2025-07-02 NOTE — PROGRESS NOTES
Anesthesia Post-Partum Follow-Up Note After  Delivery with Spinal    Patient: Camelia Santillan    Patient location: Post-partum floor    Anesthesia type: Spinal Block    Subjective  Camelia Santillan does complain of pruritis at this time. She denies weakness, denies paresthesia, admits to difficulties voiding, denies nausea or vomiting, and denies headache. She is able to ambulate and tolerates regular diet.     Objective  Respiratory Function (RR / SpO2 / Airway Patency): Satisfactory  Cardiac Function (HR / Rhythm / BP): Satisfactory  Strength and sensation lower extremities: Normal  Spinal site: No signs of infection or inflammation.     Most recent vitals  /69 (BP Location: Left arm, Patient Position: Semi-Del Cid's, Cuff Size: Adult Regular)   Pulse 53   Temp 36.7  C (98  F) (Oral)   Resp 16   LMP 10/20/2024   SpO2 99%   Breastfeeding Unknown     Assessment and plan  Camelia Santillan is a 30 year old female  post partum day #1 s/p  delivery with Spinal and TAP block for post-operative pain control.     At this time, there is no evidence of adverse side effects associated with anesthetic procedures performed. We encourage the continued use of multimodal analgesic therapy for adequate pain management over the next several days, and if any questions arise regarding anesthetic care, please reach out to the obstetric anesthesiology team.     Thank you for including us in the care of this patient.    Marilyn Becerra DO   Anesthesiology Resident, PGY-4/CA-3

## 2025-07-02 NOTE — PROVIDER NOTIFICATION
07/02/25 0457   Provider Notification   Provider Name/Title ANAHI Colon   Method of Notification Electronic Page   Request Evaluate-Remote   Notification Reason Status Update     Pt unable to void post souza catheter removal at 2010. Straight cath x 2 at 0040 and 0435.     Plan: page provider for orders for souza catheter placement if unable to void

## 2025-07-02 NOTE — LACTATION NOTE
This note was copied from a baby's chart.  Consult For: Late  Infant. IPad  used for this encounter    Infant Name: Parul    Infant's Primary Care Clinic: [facility]    Delivery Information: Parul was born at Gestational Age: 36w1d via   delivery on 2025 11:24 AM     Breastfeeding goal (if known):    Maternal Health History:    Information for the patient's mother:  Lynnkali SantillanCamelia [[id number]]     Patient Active Problem List   Diagnosis    High-risk pregnancy in first trimester    History of  delivery    Uterine scar from previous surgery    Vitamin D deficiency    Maternal varicella, non-immune    Rubella non-immune status, antepartum    Short cervix during pregnancy in second trimester     contractions    Cervical cerclage suture present in second trimester    Encounter for triage in pregnant patient    History of classical  section     Maternal Breast Exam:  Camelia noted breast growth and sensitivity in early pregnancy. She denies any history of breast/chest injury or surgery. Her breasts are soft and symmetrical with bilateral intact, everted  nipples. She has been able to hand express colostrum.     Breastfeeding/ Lactation History: Parul is Camelia's 4th baby. Her first 3 babies were all  at 28 wks, 30 wks and 30wks. She did some breastfeeding when babies were closer to term but predominantly pumped and bottle fed.     Oral exam of baby:  Camelia has a normal arched palate, good length of tongue beyond lingual frenulum attachment, good extension well beyond gum ridge & organized when sucking on finger.     Infant information: Parul was AGA at birth and has age appropriate output. She is < 24 hours old.  Parul was admitted to the NICU after birth for , low tone, lethargy and feeding difficulties. She was transferred back to the [facility] last evening around 1930.    Feeding History:  While in the NICU, Parul was  "bottle fed donor milk. Camelia was not pumping while Parul was in the NICU.     Upon transfer to the [facility], Camelia was able to start breastfeeding. She has been attempting to breastfeed at least every 3 hours and is then bottle feeding donor milk.     Feeding Assessment:  Reviewed infant positioning and tips to get a deep latch and keep infant awake at the breast.    With very minimal support, Camelia was able to position and latch Parul. Parul was alert and latched easily. She appeared to have a deep latch. She demonstrated coordinated sucking with frequent breaks. Some swallows noted. She became fatigued after about 10\" on the breast.    I reviewed hand expression with Camelia and she was able to express some large drops of colostrum.    Camelia was given donor milk in a bottle with a slow flow Dr. Barth nipple and shown how to use paced bottle feeding.    A plan was made for Camelia's bedside RN or LC to return later this morning to bring in a pump and show her how to pump as she has not yet had the opportunity to pump.     Addendum: Camelia's bedside RN was able to bring her supplies and show her how to use the [employer] pump around 10:30 am. She was able to pump 20 ml.     Education:   [x] Potential feeding challenges of late  infants  [x] Potential benefits of using a nipple shield  [x] Expected  feeding patterns in the first few days (pg. 38 of Your Guide to To Postpartum and [place] Care)/ the Second Night  [x] Stages of milk production  [x] Benefits of hand expression of colostrum  [x] Early feeding cues   [x] Feeding frequency- encourage at least every 3 hours.     [x] Benefits of feeding on cue  [x] Benefits of skin to skin  [x] Breastfeeding positions  [x] Tips to get and maintain a deep latch  [x] Nutritive vs.non-nutritive sucking  [x] Gentle breast compressions as needed to enhance milk transfer  [x] How to tell when baby is finished  [x] How to tell if baby is getting enough  [x] Expected  output  [x] " [place] weight loss  [] Infant Feeding Log  [x] [employer] Breastfeeding/Pumping videos  [] Signs breastfeeding is going well (comfortable latch, audible swallows, age appropriate output and weight loss)    [] Tips to prevent engorgement  [] Signs of engorgement  [] Tips to manage engorgement  [x] Hand expression and pumping recommendations  [x] Supplement recommendations   [] Satiety cues   [] [facility] breast pump part/infant feeding supplies cleaning recommendations  [x] Inpatient breastfeeding support  [x] Outpatient lactation resources and follow up recommendations    Handouts: [facility] Lactation Resources    Home Breast Pump: Camelia will need a pump at discharge. I reviewed the 2 types of pumps available at the hospital at discharge.     Plan: Continue breastfeeding every 2-3 hours with RN support as needed with a goal of 8-12 feedings per day. Watch for early feeding cues, but if too sleepy and no cues after 3 hours offer breast and go directly to supplementation if no interest.     Encourage frequent skin to skin. Due to infant prematurity, encourage hand expression after each feeding until milk is in and hands on pumping at least 6-8 times in 24 hours to help bring in full milk supply. Feed back any expressed milk and review order set for supplement recommendations. Continue giving expressed milk supplement until closer to expected due date, or as indicated with follow up lactation visits.      Family encouraged to follow up with outpatient lactation consultant at clinic within a week of discharge for support with LPT infant, help to monitor infant weight and milk transfer, establish supply & eventually wean from pumping and nipple shield if used. Family plans to follow up with [facility].        Sienna Pringle RN, IBCLC   Lactation Consultant  [place]: [facility] [phone]  Office: [phone]

## 2025-07-02 NOTE — CONSULTS
Social Work Progress Note      DATA    Camelia is a 30 year old. She lives in [place] with her  and three other children. PARMINDER received consult for financial concerns and community resources. PARMINDER met with Camelia in her [facility] room with the support of an  to discuss her concerns and her needs.     ASSESSMENT    SW spoke with Camelia while she rested in her room today. SW spoke with Camelia about her main concerns regarding her finances and her bills. Camelia spoke about her worries about getting connected to rental assistance, bill assistance, and resources for her baby girl, Parul. PARMINDER provided Camelia with some psychoedcuation on programs in [place] that are available to her. PARMINDER will connect Camelia with CLUES and provide her with a list of financial assistance resources including rent assistance, diaper carter in her area, and formula resources.   PARMINDER and Camelia discussed WIC as a stable continuous resource for Camelia. Camelia has wanted to get involved with WIC but was unsure where to start. PARMINDER will submit a referral for WIC for Camelia today.   Camelia shared with PARMINDER that her older children are in [place] [employer] and that she had been referred to the [employer] program but had not heard from anyone about getting rental assistance since her and her  have lost their jobs.     PARMINDER brought Camelia a bag of new born diapers from [facility]. PARMINDER will bring a pack and play for baby Parul and a parking pass for the family.    PARMINDER is available for support throughout Camelia's admission and is available for reconsult if needed.     INTERVENTION    Conducted chart review and consulted with medical team regarding plan of care.  Provided assessment of patient and family's level of coping  Provided psychosocial supportive counseling and crisis intervention  Provided solution-focused therapeutic support  Validated emotions and provided supportive listening  Facilitated service linkage with hospital and community  resources    PLAN    Continue care. SW will continue to follow and provide support throughout admission.     LILIAM Pavon, [facility]  Maternal and Child Health   M-F 08:00-16:30 on [employer]  Cell Phone: [phone]  [email or web address]

## 2025-07-02 NOTE — PROGRESS NOTES
[facility]   Post-partum Progress Note    Name:  Camelia Santillan  MRN: [id number]    S: Camelia is doing well today. She was able to void spontaneously after straight cath x2, and now reports no issues. Tolerating regular diet without nausea or vomiting.  Ambulating without without any issues, no dizziness.  Lochia is within expected limits.  Passing flatus, has had a bowel movement. Denies any fever, chills, SOB, chest pain, N/V, headache, vision changes, RUQ pain, dizziness. Breastfeeding well. Feels ready for discharge today.     O:   Patient Vitals for the past 24 hrs:   BP Temp Temp src Pulse Resp Weight   25 0756 104/76 98.4  F (36.9  C) Oral 77 16 --   25 0627 -- -- -- -- -- 54.6 kg (120 lb 6.4 oz)   25 0040 108/68 98.4  F (36.9  C) Oral 66 16 --   25 1749 92/63 98.3  F (36.8  C) Oral 67 16 --     Gen:  Resting comfortably, NAD  CV:  RR, well perfused  Pulm:  Non-labored breathing on room air  Abd:  Soft, appropriately ttp, non-distended.Fundus at umbilicus, firm and non-tender.   Incision: Steri strips with minimal dried blood, no erythema  Ext:  non-tender, trace edema to bilateral lower extremities    Weight:   Vitals:    25   Weight: 54.6 kg (120 lb 6.4 oz)       I/O last 3 completed shifts:  In: -   Out: 2575 [Urine:2575]    Labs:  Hemoglobin   Date Value Ref Range Status   2025 10.8 (L) 11.7 - 15.7 g/dL Final   2025 10.8 (L) 11.7 - 15.7 g/dL Final       Rubella Antibody IgG   Date Value Ref Range Status   01/10/2025 No detectable antibody.  Final       Assessment/Plan:  Camelia Santillan 30 year old  on POD#2 s/p RLTCS and removal cerclage. Pregnancy was complicated by rubella and varicella NI and post partum course has been complicated by lack of spontaneous void s/p souza. Meeting early post operative goals with plan for discharge home    # Postpartum  management  Pain: Well-controlled with ibuprofen, tylenol, and oxycodone PRN   GI:  EARLINE bowel regimen, anti-emetics  : See below  Hgb: Hgb 10.8> > 10.8. Asymptomatic from blood loss anemia   Rh: Positive  Feed: Breast feeding  Infant:  Stable in room.   BC: S/p nexplanon placement  PPx:  Encourage ambulation, IS, SCDs while confined to bed    # Rubella NI  # Varicella NI  - MMRV PTD    # POUR, resolved  - Straight cath x2, able to void prior to next check    # BV, s/p 3D flagyl  - Asymptomatic     # Asymptomatic hypotension, resolved  - Blood pressures around 80/50s, she has been asymptomatic with normal heart rate and from chart review she runs low.   - NTD    Dispo: Anticipate discharge home today    Veronica Colon MD, MSc  [facility], PGY-2  07/03/2025 6:38 AM    Appreciate note by Dr. Colon. Patient has been seen and examined by me separate from the resident, agree with above note.     Adrianne Chun MD  10:30 AM

## 2025-07-02 NOTE — PLAN OF CARE
Goal Outcome Evaluation:      Plan of Care Reviewed With: patient    Problem: Postpartum ( Delivery)  Goal: Optimal Pain Control and Function  Outcome: Progressing     Problem: Postpartum ( Delivery)  Goal: Effective Urinary Elimination  Outcome: Met     Overall Patient Progress: improvingOverall Patient Progress: improving    Outcome Evaluation: Patient has been rating her pain as mild and able to ambulate well in the room.Voided adequately and measured. She's been breastfeeding and also started pumping with result. Bonding well with baby. Will continue with plan of care.

## 2025-07-02 NOTE — PLAN OF CARE
Goal Outcome Evaluation:      Plan of Care Reviewed With: patient    Overall Patient Progress: improvingOverall Patient Progress: improving     Pt VSS with improved BP after LR bolus.  Pain managed with tylenol and toradol.  Pt is bonding well with baby and breastfeeding on cue.  Baby also supplemented with DBM via bottle after feeds.  C/s incision covered with guaze CDI.  Ventura removed at 2000 - pt due to void.  Pt is ambulating with SBA, and tolerating regular diet.  Continue with plan of care.       Problem: Adult Inpatient Plan of Care  Goal: Optimal Comfort and Wellbeing  Intervention: Provide Person-Centered Care  Recent Flowsheet Documentation  Taken 7/1/2025 1735 by Funmi Acharya, RN  Trust Relationship/Rapport:   care explained   choices provided   questions answered   questions encouraged   reassurance provided   thoughts/feelings acknowledged

## 2025-07-03 ENCOUNTER — OFFICE VISIT (OUTPATIENT)
Dept: INTERPRETER SERVICES | Facility: CLINIC | Age: 30
End: 2025-07-03
Payer: COMMERCIAL

## 2025-07-03 VITALS
HEART RATE: 77 BPM | WEIGHT: 120.4 LBS | BODY MASS INDEX: 24.6 KG/M2 | RESPIRATION RATE: 16 BRPM | SYSTOLIC BLOOD PRESSURE: 104 MMHG | OXYGEN SATURATION: 98 % | TEMPERATURE: 98.4 F | DIASTOLIC BLOOD PRESSURE: 76 MMHG

## 2025-07-03 PROCEDURE — 90471 IMMUNIZATION ADMIN: CPT

## 2025-07-03 PROCEDURE — 250N000011 HC RX IP 250 OP 636

## 2025-07-03 PROCEDURE — 250N000013 HC RX MED GY IP 250 OP 250 PS 637

## 2025-07-03 PROCEDURE — 90710 MMRV VACCINE SC: CPT

## 2025-07-03 RX ORDER — SIMETHICONE 80 MG
80 TABLET,CHEWABLE ORAL EVERY 6 HOURS PRN
Qty: 30 TABLET | Refills: 0 | Status: SHIPPED | OUTPATIENT
Start: 2025-07-03

## 2025-07-03 RX ORDER — ACETAMINOPHEN 325 MG/1
650 TABLET ORAL EVERY 6 HOURS PRN
Qty: 100 TABLET | Refills: 0 | Status: SHIPPED | OUTPATIENT
Start: 2025-07-03

## 2025-07-03 RX ORDER — OXYCODONE HYDROCHLORIDE 5 MG/1
5 TABLET ORAL EVERY 4 HOURS PRN
Qty: 3 TABLET | Refills: 0 | Status: SHIPPED | OUTPATIENT
Start: 2025-07-03

## 2025-07-03 RX ORDER — IBUPROFEN 600 MG/1
600 TABLET, FILM COATED ORAL EVERY 6 HOURS PRN
Qty: 60 TABLET | Refills: 0 | Status: SHIPPED | OUTPATIENT
Start: 2025-07-03

## 2025-07-03 RX ORDER — AMOXICILLIN 250 MG
1 CAPSULE ORAL DAILY
Qty: 100 TABLET | Refills: 0 | Status: SHIPPED | OUTPATIENT
Start: 2025-07-03

## 2025-07-03 RX ADMIN — SENNOSIDES AND DOCUSATE SODIUM 2 TABLET: 50; 8.6 TABLET ORAL at 07:46

## 2025-07-03 RX ADMIN — ACETAMINOPHEN 975 MG: 325 TABLET ORAL at 00:34

## 2025-07-03 RX ADMIN — SIMETHICONE 80 MG: 80 TABLET, CHEWABLE ORAL at 06:28

## 2025-07-03 RX ADMIN — IBUPROFEN 800 MG: 800 TABLET, FILM COATED ORAL at 03:53

## 2025-07-03 RX ADMIN — MEASLES, MUMPS, RUBELLA AND VARICELLA VIRUS VACCINE LIVE 0.5 ML: 1000; 20000; 1000; 9772 INJECTION, POWDER, LYOPHILIZED, FOR SUSPENSION SUBCUTANEOUS at 10:03

## 2025-07-03 RX ADMIN — ACETAMINOPHEN 975 MG: 325 TABLET ORAL at 06:28

## 2025-07-03 RX ADMIN — SIMETHICONE 80 MG: 80 TABLET, CHEWABLE ORAL at 00:34

## 2025-07-03 RX ADMIN — IBUPROFEN 800 MG: 800 TABLET, FILM COATED ORAL at 10:10

## 2025-07-03 ASSESSMENT — ACTIVITIES OF DAILY LIVING (ADL)
ADLS_ACUITY_SCORE: 19

## 2025-07-03 NOTE — PROCEDURES
Preoperative Diagnosis: Desires long-acting reversible contraception     Postoperative Diagnosis: Desired long-acting reversible contraception, s/p Nexplanon placement     Consent: Risks, benefits of treatment, and treatment were discussed. Patient's questions were elicited and answered. Written consent signed and scanned into medical record. Patient received and verbalized understanding of discharge instructions    Skin Preparation: Betadine     Anesthesia: 3 mL 1% lidocaine without EPI     Technique: Patient was consented for Nexplanon placement. The patient's right arm was flexed and externally rotated with her wrist parallel to her ear. The groove between the biceps and triceps muscles was palpated and the prior nexplanon removal site was identified inferior to and 8 cm from the olecranon process. The skin was cleansed with alcohol, and 3 cc of 1% lidocaine was inserted along the path of the insert. The skin was then prepped with betadine x3. The nexplanon was inserted at a 30 degree angle, and then tented up and advanced superficially just beneath the epidermis. The applicator seal was broken and the cannula was then retracted. The patient's arm was examined and the Nexplanon yenny was easily palpated. The patient also palpated their arm and was able to appreciate the presence of the yenny. Hemostasis was achieved with pressure and a pressure dressing was placed over the patient's arm. The patient tolerated the procedure well.   EBL: <5 mL   Complications: None   Nexplanon Lot#: [id number]  Exp: 2027-06  Veronica Colon MD, MSc  [facility], PGY-2  07/02/2025 8:32 PM    Staff MD Note  I was present for the entire procedure noted above.  I agree with the description above and any necessary changes have been made by me.  Danielle Roca MD

## 2025-07-03 NOTE — DISCHARGE INSTRUCTIONS
Warning Signs after Having a Baby    Keep this paper on your fridge or somewhere else where you can see it.    Call your provider if you have any of these symptoms up to 12 weeks after having your baby.    Thoughts of hurting yourself or your baby  Pain in your chest or trouble breathing  Severe headache not helped by pain medicine  Eyesight concerns (blurry vision, seeing spots or flashes of light, other changes to eyesight)  Fainting, shaking or other signs of a seizure    Call 9-1-1 if you feel that it is an emergency.     The symptoms below can happen to anyone after giving birth. They can be very serious. Call your provider if you have any of these warning signs.    My provider s phone number: _______________________    Losing too much blood (hemorrhage)    Call your provider if you soak through a pad in less than an hour or pass blood clots bigger than a golf ball. These may be signs that you are bleeding too much.    Blood clots in the legs or lungs    After you give birth, your body naturally clots its blood to help prevent blood loss. Sometimes this increased clotting can happen in other areas of the body, like the legs or lungs. This can block your blood flow and be very dangerous.     Call your provider if you:  Have a red, swollen spot on the back of your leg that is warm or painful when you touch it.   Are coughing up blood.     Infection    Call your provider if you have any of these symptoms:  Fever of 100.4 F (38 C) or higher.  Pain or redness around your stitches if you had an incision.   Any yellow, white, or green fluid coming from places where you had stitches or surgery.    Mood Problems (postpartum depression)    Many people feel sad or have mood changes after having a baby. But for some people, these mood swings are worse.     Call your provider right away if you feel so anxious or nervous that you can't care for yourself or your baby.    Preeclampsia (high blood pressure)    Even if you  didn't have high blood pressure when you were pregnant, you are at risk for the high blood pressure disease called preeclampsia. This risk can last up to 12 weeks after giving birth.     Call your provider if you have:   Pain on your right side under your rib cage  Sudden swelling in the hands and face    Remember: You know your body. If something doesn't feel right, get medical help.     For informational purposes only. Not to replace the advice of your health care provider. Copyright 2020 [facility]. All rights reserved. Clinically reviewed by Elena Raymundo, RNC-OB, MSN. Parents R People [id number] - Rev .    Parto por cesárea: Qué esperar [facility] el [facility]   Section: What to Expect at Home  [place] recuperación     Un parto por cesárea, o simplemente cesárea, es eva cirugía mediante la cual se da a chan a un bebé a través de un arina, llamado incisión, que hace el médico en la parte baja del abdomen y el útero.  Es posible que sienta dolor en la parte baja del abdomen y que necesite analgésicos (medicamentos para el dolor) jeimy 1 o 2 semanas. Puede esperar tener algo de sangrado vaginal jeimy varias semanas. Es probable que necesite unas 6 semanas para recuperarse completamente.  Es importante que se tome las cosas con calma mientras josef la incisión. Evite levantar objetos pesados, hacer actividades vigorosas o hacer ejercicios que pudieran esforzar los músculos abdominales mientras se recupera. Pídale a un familiar o amigo que la ayude con las tareas domésticas, la comida y las compras.  Esta hoja de cuidados le da eva idea general del tiempo que le llevará recuperarse. Sin embargo, cada persona se recupera a un ritmo diferente. Siga los pasos que se mencionan a continuación para recuperarse lo más rápido posible.   Cómo puede cuidarse en el hogar?  Actividad       Descanse cuando se sienta cansada. Dormir lo suficiente la ayudará a recuperarse.        Intente caminar todos los días. Comience  caminando un poco más de lo que caminó el día anterior. Poco a poco, aumente la distancia. Caminar mejora el flujo de georgette y ayuda a prevenir la neumonía, el estreñimiento y los coágulos de georgette.        Evite las actividades intensas, anselmo montar en bicicleta, trotar, levantar pesas y hacer ejercicios aeróbicos jeimy 6 semanas o hasta que osorio médico lo apruebe.        Hasta que osorio médico lo apruebe, no levante nada más pesado que osorio bebé.        No jennifer abdominales ni ningún otro ejercicio que utilice los músculos del abdomen jeimy 6 semanas o hasta que osorio médico lo apruebe.        Sostenga eva almohada sobre la incisión al toser o respirar profundamente. Le proporcionará apoyo a osorio abdomen y reducirá el dolor.        Puede ducharse anselmo de costumbre. Seque la incisión con toques suaves de toalla cuando termine.        Tendrá algo de sangrado vaginal. Use toallas sanitarias. No se jennifer lavados vaginales ni use tampones hasta que el médico se lo permita.        Pregúntele a osorio médico cuándo puede volver a conducir.        Es probable que necesite ausentarse del trabajo por lo menos 6 semanas. [place] dependerá del tipo de trabajo que jennifer y de cómo se sienta.        Pregúntele a osorio médico cuándo puede tener relaciones sexuales.   Alimentación       Puede continuar con osorio dieta normal. Si tiene malestar estomacal, coma alimentos suaves bajos en grasa, anselmo arroz sin condimentar, faustino a la genet, pan leana y yogur.        No abundantes líquidos (a menos que osorio médico le indique lo contrario).        Podría notar que no evacua el intestino con regularidad ange después de la cirugía. [place] es común. Trate de evitar el estreñimiento y no hacer esfuerzos cuando evacua el intestino. Blu vez desee amanda un suplemento de fibra todos los días. Si no ha evacuado el intestino después de un par de días, pregúntele a osorio médico si puede amanda un laxante suave.        Si está amamantando, limite el alcohol. El alcohol  puede causar falta de energía y otros problemas de franko para el bebé cuando eva lizzeth que amamanta lee mucho. También puede ser un obstáculo en la capacidad de eva mamá para alimentar a osorio bebé o para cuidarlo en otros aspectos. No hay mucha investigación sobre qué cantidad exacta de alcohol puede ser perjudicial para un bebé. No consumir alcohol es la opción más snell para osorio bebé. Si opta por beber eva bebida alcohólica de vez en cuando, solo tome eva bebida y limite las ocasiones en que claudia alcohol. Después de beber alcohol, espere al menos 2 horas antes de amamantar para reducir la cantidad de alcohol que el bebé pueda recibir a través de la leche.   Medicamentos       Osorio médico le dirá si puede volver a amanda galileo medicamentos y cuándo puede volver a hacerlo. También le dará indicaciones sobre cualquier medicamento nuevo que deba amanda usted.        Si dejó de amanda aspirina o algún otro anticoagulante, osorio médico le dirá cuándo puede comenzar a tomarlo nuevamente.        [place] los analgésicos (medicamentos para el dolor) exactamente anselmo le fueron indicados.  Si el médico le recetó un analgésico, tómelo según las indicaciones.  Si no está tomando un analgésico recetado, pregúntele a osorio médico si puede amanda sunshine de venta orly.        Si le parece que el analgésico le está produciendo malestar estomacal:  [place] el medicamento después de las comidas (a menos que osorio médico le haya indicado lo contrario).  Pídale al médico un analgésico diferente.        Si osorio médico le recetó antibióticos, tómelos según las indicaciones. No deje de tomarlos por el hecho de sentirse mejor. Debe amanda todos los antibióticos hasta terminarlos.   Cuidado de la incisión       Si tiene tiras de cinta adhesiva sobre la incisión, déjeselas puestas jeimy eva semana o hasta que se caigan por sí solas.        Lave la sanam a diario con agua jabonosa tibia y séquela con toques suaves de toalla. No use peróxido de hidrógeno (agua oxigenada)  "o alcohol porque pueden retrasar la sanación. Podría cubrir la sanam con eva venda de gasa si supura o roza contra la ropa. Cambie la venda todos los salima.        Mantenga la sanam limpia y seca.   Otras instrucciones       Si amamanta a osorio bebé, josé miguel vez le resulte más cómodo, jeimy el proceso de sanación, sostener al bebé de alguna manera en la que no se apoye en osorio abdomen. Intente poner a osorio bebé debajo del brazo, con el cuerpo del lado que lo vaya a amamantar. Sostenga la parte superior del cuerpo de osorio bebé con osorio brazo. Con frank mano usted puede controlar la cherry de osorio bebé para acercarle la boca a osorio seno. [place] se conoce a veces anselmo \"posición de balón de fútbol americano\".   La atención de seguimiento es eva parte clave de osorio tratamiento y seguridad. Asegúrese de hacer y acudir a todas las citas, y llame a osorio médico si está teniendo problemas. También es eva buena idea saber los resultados de galileo exámenes y mantener eva lista de los medicamentos que juancarlos.   Cuándo debe pedir ayuda?  Comparta esta información con osorio jr, osorio del o eva amiga. Pueden ayudarla a prestar atención a las señales de advertencia.  Llame al 911  en cualquier momento que considere que necesita atención de urgencia. Por ejemplo, llame si:       Tiene pensamientos de herirse a sí misma, hacerle daño a osorio bebé o a otra persona.        Se desmayó (perdió el conocimiento).        Tiene dolor en el pecho, le falta el aire o tose georgette.        Tiene convulsiones.   Dónde obtener ayuda las 24 horas del día, los 7 días de la semana   Si usted o alguien que conoce habla de suicidio, autolesionarse, eva crisis de franko mental, eva crisis por consumo de sustancias o cualquier otro tipo de malestar psíquico, obtenga ayuda de inmediato. Usted puede:       Marcar 988 para llamar a la línea de prevención del suicidio y crisis.        Llamar al [phone] ([phone]).        Enviar un mensaje de texto que diga HOME al [id number] para " acceder a la línea de mensajes de texto en casos de crisis.   Considere guardar estos números en osorio teléfono.  Visite [email or web address] para obtener más información o conversar en línea.  Llame al médico ahora mismo o busque atención médica inmediata si:       Tiene puntos de sutura flojos o se le abre la incisión.        Tiene señales de hemorragia (sangrado excesivo), anselmo:  Sangrado vaginal intenso. [place]a significa que está empapando eva o más toallas sanitarias en eva hora. O expulsa coágulos de georgette más grandes que un huevo.  Se siente mareada o aturdida, o siente anselmo si se fuera a desmayar.  Se siente tan cansada o débil que no puede hacer galileo actividades habituales.  Latidos cardíacos acelerados o irregulares.  Dolor abdominal nuevo o más intenso.        Tiene síntomas de infección, tales anselmo:  Aumento del dolor, la hinchazón, la temperatura o el enrojecimiento.  Vetas rojizas que salen de la incisión.  Pus que sale de la incisión.  Fiebre.  Micción frecuente o dolorosa o georgette en la orina.  Secreción vaginal con olor desagradable.  Dolor abdominal nuevo o más intenso.        Tiene síntomas de un coágulo de georgette en la pierna (llamado trombosis venosa profunda), tales anselmo:  Dolor en la pantorrilla, la parte posterior de la rodilla, el muslo o la dave.  Hinchazón en la pierna o la dave.  Un cambio de color en la pierna o la dave. La piel podría estar enrojecida o morada, según cuál sea osorio color de piel normal.        Tiene señales de preeclampsia, tales anselmo:  Hinchazón repentina de la sandra, las julio o los pies.  Nuevos problemas de visión (anselmo oscurecimiento, gabriel borroso o gabriel puntos).  Dolor de cherry intenso.        Tiene señales de insuficiencia cardíaca, tales anselmo:  Nueva o mayor falta de aire.  Nueva o mayor hinchazón en las piernas, los tobillos o los pies.  Aumento repentino de peso, anselmo más de 2 o 3 libras (0.9 kg o 1.4 kg) en un día o 5 libras (2.3 kg) en eva semana.  Se siente tan cansada  "o débil que no puede hacer galileo actividades habituales.        Se sometió a un alivio del dolor raquídeo o epidural y tiene:  Dolor de espalda nuevo o peor.  Aumento del dolor, la hinchazón, la temperatura o el enrojecimiento en el lugar de la inyección.  Hormigueo, debilidad o entumecimiento en las piernas o la dave.   Preste especial atención a los cambios en osorio franko y asegúrese de comunicarse con osorio médico si:       El sangrado vaginal no disminuye.        Siente tristeza, ansiedad o desesperanza jeimy más de algunos días.        Está teniendo problemas con los senos o la lactancia.    Dónde puede encontrar más información en inglés?  Vaya a [email or web address].net/[email or web address]  Escriba M806 en la búsqueda para aprender más acerca de \"Parto por cesárea: Qué esperar en el hogar.\"  Revisado: 30 abril, 2024  Versión del contenido: 14.5    [id number] [employer].   Las instrucciones de cuidado fueron adaptadas bajo licencia por osorio profesional de atención médica. Si usted tiene preguntas sobre eva afección médica o sobre estas instrucciones, siempre pregunte a osorio profesional de franko. [employer] niega toda garantía o responsabilidad por osorio uso de esta información.  El período posparto: Instrucciones de cuidado-Instrucciones de cuidado  Postpartum: Care Instructions  Instrucciones de cuidado  Después del parto (período posparto), osorio cuerpo pasa por muchos cambios. Algunos de estos cambios suceden jeimy varias semanas. Jeimy las horas posteriores al parto, el cuerpo comienza a recuperarse y se prepara para el amamantamiento. Es posible que jeimy agustín tiempo se sienta sensible. Las hormonas pueden cambiar osorio estado de ánimo sin advertencia y sin motivo aparente.  Jeimy las primeras semanas después del parto, muchas mujeres tienen emociones que cambian de walters a maria elena. Es posible que le resulte difícil dormir. Es posible que llore mucho. [place] se llama \"melancolía de la " "maternidad\". Estas emociones abrumadoras suelen desaparecer dentro de unos días o semanas. [place] es importante hablar con osorio médico acerca de galileo sentimientos.  Jeimy las primeras semanas después del parto, es fácil cansarse demasiado o sentirse abrumada. No jennifer demasiados esfuerzos. Descanse cada vez que pueda, acepte la ayuda de los demás, coma pepe y claudia abundantes líquidos.  En el primer par de semanas después de seth a chan, es posible que osorio médico o partera deseen verla y hacer un plan para cualquier atención de seguimiento que usted pueda necesitar. Probablemente tendrá eva hunter completa de posparto dentro de los primeros 3 meses después del parto. En leidy momento, osorio médico o partera revisarán osorio recuperación del parto. Él o stan también verán cómo está enfrentando usted galileo emociones y conversarán sobre galileo inquietudes y preguntas.  La atención de seguimiento es eva parte clave de osorio tratamiento y seguridad. Asegúrese de hacer y acudir a todas las citas, y llame a osorio médico si está teniendo problemas. También es eva buena idea saber los resultados de galileo exámenes y mantener eva lista de los medicamentos que juancarlos.   Cómo puede cuidarse en el hogar?  [place] o descanse cuando osorio bebé lo jennifer.  Si es posible, permita que galileo familiares o galileo amigos la ayuden con las tareas del hogar. No intente hacerlo todo usted alejandra.  Si tiene hemorroides o hinchazón o dolor alrededor de la abertura de la vagina, pruebe aplicarse frío y calor. Puede aplicarse hielo o eva compresa fría en la sanam jeimy 10 a 20 minutos cada vez. Póngase un paño ponce entre el hielo y la piel. Además, trate de sentarse en algunos centímetros de agua tibia (baño de asiento) 3 veces al día y después de las evacuaciones.  [place] los analgésicos (medicamentos para el dolor) exactamente según las indicaciones.  Si el médico le recetó un analgésico, tómelo según las indicaciones.  Si no está tomando un analgésico recetado, pregúntele a osorio médico si " puede amanda sunshine de venta orly.  Coma más fibra para evitar el estreñimiento. Incluya alimentos anselmo panes y cereales integrales, verduras crudas, frutas crudas y secas, y frijoles (habichuelas).  No mucho líquido. Si tiene eva enfermedad renal, cardíaca o hepática y tiene que restringir los líquidos, hable con osorio médico antes de aumentar la cantidad de líquido que lee.  No se lave el interior de la vagina con líquidos (lavados vaginales).  Si tiene puntos de sutura, mantenga la sanam limpia con agua tibia, ya sea echándola o rociándola sobre la sanam externa de la vagina y el ano después de usar el baño.  Lleve eva lista de preguntas para hacerle a osorio médico o partera. Jackie preguntas podrían ser acerca de lo siguiente:  Cambios en los senos, anselmo bultos o sensibilidad.  Cuándo esperar que regrese osorio período menstrual.  Qué forma de anticoncepción es la más adecuada para usted.  El peso que aumentó jeimy el embarazo.  Las opciones de ejercicio.  Qué alimentos y bebidas son mejores para usted, sobre todo si está amamantando.  Problemas que podría tener con la lactancia.  Cuándo puede tener relaciones sexuales. Algunas mujeres josé miguel vez quieran hablar sobre lubricantes vaginales.  Cualquier sentimiento de tristeza o inquietud que tenga.   Cuándo debe pedir ayuda?  Comparta esta información con osorio jr, osorio del o eva amiga. Pueden ayudarla a prestar atención a las señales de advertencia.  Llame al 911  en cualquier momento que considere que necesita atención de urgencia. Por ejemplo, llame si:    Tiene pensamientos de lastimarse o de hacerle daño a osorio bebé o a otra persona.     Se desmayó (perdió el conocimiento).     Tiene dolor en el pecho, le falta el aire o tose georgette.     Tiene convulsiones.   Dónde obtener ayuda las 24 horas del día, los 7 días de la semana   Si usted o alguien que conoce habla de suicidio, autolesionarse, eva crisis de franko mental, eva crisis por consumo de sustancias o cualquier otro  tipo de malestar psíquico, obtenga ayuda de inmediato. Usted puede:    Marcar 988 para llamar a la línea de prevención del suicidio y crisis.     Llamar al [phone] ([phone]).     Enviar un mensaje de texto que diga HOME al [id number] para acceder a la línea de mensajes de texto en casos de crisis.   Considere guardar estos números en osorio teléfono.  Visite [email or web address] para obtener más información o conversar en línea.  Llame a osorio médico ahora mismo o busque atención médica de inmediato si:    Tiene señales de hemorragia (sangrado excesivo), anselmo:  Sangrado vaginal intenso. [place] significa que está empapando eva o más toallas sanitarias en eva hora. O expulsa coágulos de georgette más grandes que un huevo.  Se siente mareada o aturdida, o siente anselmo si se fuera a desmayar.  Se siente tan cansada o débil que no puede hacer galileo actividades habituales.  Latidos cardíacos acelerados o irregulares.  Dolor abdominal nuevo o más intenso.     Tiene señales de infección, tales anselmo:  Fiebre.  Micción frecuente o dolorosa o georgette en la orina.  Secreción vaginal con olor desagradable.  Dolor abdominal nuevo o más intenso.     Tiene síntomas de un coágulo de georgette en la pierna (llamado trombosis venosa profunda), tales anselmo:  Dolor en la pantorrilla, la parte posterior de la rodilla, el muslo o la dave.  Hinchazón en la pierna o la dave.  Un cambio de color en la pierna o la dave. La piel podría estar enrojecida o morada, según cuál sea osorio color de piel normal.     Tiene señales de preeclampsia, tales anselmo:  Hinchazón repentina de la sandra, las juloi o los pies.  Nuevos problemas de la vista (anselmo oscurecimiento, gabriel borroso o gabriel puntos).  Dolor de cherry intenso.     Tiene señales de insuficiencia cardíaca, tales anselmo:  Nueva o mayor falta de aire.  Nueva o mayor hinchazón en las piernas, los tobillos o los pies.  Aumento repentino de peso, anselmo más de 2 o 3 libras (0.9 kg o 1.4 kg) en un día o 5 libras (2.3 kg)  "en eva semana.  Se siente tan cansada o débil que no puede hacer galileo actividades habituales.     Se sometió a un alivio del dolor raquídeo o epidural y tiene:  Dolor de espalda nuevo o peor.  Aumento del dolor, la hinchazón, la temperatura o el enrojecimiento en el lugar de la inyección.  Hormigueo, debilidad o entumecimiento en las piernas o la dave.   Preste especial atención a los cambios en osorio franko y asegúrese de comunicarse con osorio médico si:    El sangrado vaginal no disminuye.     Siente tristeza, ansiedad o desesperanza jeimy más de algunos días.     Tiene problemas con los senos o el amamantamiento.    Dónde puede encontrar más información en inglés?  Vaya a [email or web address].net/[email or web address]  Escriba Z768 en la búsqueda para aprender más acerca de \"El período posparto: Instrucciones de cuidado-Instrucciones de cuidado.\"  Revisado: 30 abril, 2024  Versión del contenido: 14.5    [id number] [employer].   Las instrucciones de cuidado fueron adaptadas bajo licencia por osorio profesional de atención médica. Si usted tiene preguntas sobre eva afección médica o sobre estas instrucciones, siempre pregunte a osorio profesional de franko. [employer] niega toda garantía o responsabilidad por osorio uso de esta información.    "

## 2025-07-03 NOTE — PLAN OF CARE
Goal Outcome Evaluation:  Patient's discharge goals met and discharge to home today with baby. AVS reviewed and verbalized understanding.  Questions were encouraged and addressed with in person .

## 2025-07-03 NOTE — PLAN OF CARE
Goal Outcome Evaluation:    Plan of Care Reviewed With: patient    Overall Patient Progress: improving    VSS. Denies chest pain, SOB, nausea, dizziness. Up ad chelsey and voiding. Has also had a bowel movement. PP assessments WDL. Incision is JASON, steristrips in place with no s/s of infection. PP assessments WDL. Pain medicated per MAR for incisional pain and gas pain. Also using abdominal binder and hot packs for comfort. Pt is breastfeeding baby girl immediately. She is also pumping independently for baby and supplementing with her expressed milk. Great bonding with baby girl Parul. No support persons overnight. Pt is hoping to discharge this AM.     Continue with postpartum cares and education per POC.       Problem: Adult Inpatient Plan of Care  Goal: Readiness for Transition of Care  Outcome: Progressing     Problem: Postpartum ( Delivery)  Goal: Effective Bowel Elimination  Outcome: Progressing     Problem: Postpartum ( Delivery)  Goal: Optimal Pain Control and Function  Outcome: Progressing

## 2025-07-06 ENCOUNTER — PATIENT OUTREACH (OUTPATIENT)
Dept: CARE COORDINATION | Facility: CLINIC | Age: 30
End: 2025-07-06
Payer: COMMERCIAL

## 2025-07-06 NOTE — PROGRESS NOTES
Connected Care Resource Center:   [facility] Resource Center Contact  [facility]     Clinical Data: Post-Discharge Outreach     Outreach attempted x 2.  Left message on patient's voicemail, providing [facility]'s central phone number of [phone] ([phone]) for questions/concerns and/or to schedule an appt with an [facility] provider, if they do not have a PCP.      Plan:  [facility] will do no further outreaches at this time.       Julita Domínguez MA  Connected Care Resource Center, [facility]    *Connected Care Resource Team does NOT follow patient ongoing. Referrals are identified based on internal discharge reports and the outreach is to ensure patient has an understanding of their discharge instructions.

## (undated) DEVICE — GLOVE PROTEXIS BLUE W/NEU-THERA 7.0  2D73EB70

## (undated) DEVICE — DRSG ABDOMINAL 07 1/2X8" 7197D

## (undated) DEVICE — LUBRICATING JELLY 2.7GM T00137

## (undated) DEVICE — CATH TRAY FOLEY 16FR BARDEX W/DRAIN BAG STATLOCK 300316A

## (undated) DEVICE — SPONGE RAY-TEC 4X8" 7318

## (undated) DEVICE — Device

## (undated) DEVICE — SOL NACL 0.9% IRRIG 1000ML BOTTLE 2F7124

## (undated) DEVICE — STOCKING SLEEVE COMPRESSION CALF LG

## (undated) DEVICE — DRSG STERI STRIP 1/4X3" R1541

## (undated) DEVICE — PREP SKIN SCRUB TRAY 4461A

## (undated) DEVICE — ESU PENCIL W/SMOKE EVAC NEPTUNE STRYKER 0703-046-000

## (undated) DEVICE — CATH TRAY FOLEY SURESTEP 16FR WDRAIN BAG STLK LATEX A300316A

## (undated) DEVICE — STRAP KNEE/BODY 31143004

## (undated) DEVICE — SOL WATER IRRIG 1000ML BOTTLE 07139-09

## (undated) DEVICE — PREP CHLORAPREP 26ML TINTED ORANGE  260815

## (undated) DEVICE — SOL NACL 0.9% IRRIG 1000ML BOTTLE 07138-09

## (undated) DEVICE — LIGHT HANDLE X2

## (undated) DEVICE — GLOVE ESTEEM POWDER FREE SMT 7.0  2D72PT70

## (undated) DEVICE — PACK C-SECTION LF PL15OTA83B

## (undated) RX ORDER — MORPHINE SULFATE 1 MG/ML
INJECTION, SOLUTION EPIDURAL; INTRATHECAL; INTRAVENOUS
Status: DISPENSED
Start: 2025-07-01

## (undated) RX ORDER — GLYCOPYRROLATE 0.2 MG/ML
INJECTION, SOLUTION INTRAMUSCULAR; INTRAVENOUS
Status: DISPENSED
Start: 2025-07-01

## (undated) RX ORDER — CEFAZOLIN SODIUM/WATER 2 G/20 ML
SYRINGE (ML) INTRAVENOUS
Status: DISPENSED
Start: 2025-07-01

## (undated) RX ORDER — EPINEPHRINE 1 MG/ML
INJECTION, SOLUTION, CONCENTRATE INTRAVENOUS
Status: DISPENSED
Start: 2025-07-01

## (undated) RX ORDER — ACETAMINOPHEN 325 MG/1
TABLET ORAL
Status: DISPENSED
Start: 2025-03-14

## (undated) RX ORDER — BUPIVACAINE HYDROCHLORIDE 2.5 MG/ML
INJECTION, SOLUTION EPIDURAL; INFILTRATION; INTRACAUDAL; PERINEURAL
Status: DISPENSED
Start: 2025-07-01

## (undated) RX ORDER — KETOROLAC TROMETHAMINE 30 MG/ML
INJECTION, SOLUTION INTRAMUSCULAR; INTRAVENOUS
Status: DISPENSED
Start: 2025-07-01

## (undated) RX ORDER — ONDANSETRON 2 MG/ML
INJECTION INTRAMUSCULAR; INTRAVENOUS
Status: DISPENSED
Start: 2025-07-01

## (undated) RX ORDER — FENTANYL CITRATE 50 UG/ML
INJECTION, SOLUTION INTRAMUSCULAR; INTRAVENOUS
Status: DISPENSED
Start: 2025-07-01